# Patient Record
Sex: FEMALE | Race: WHITE | NOT HISPANIC OR LATINO
[De-identification: names, ages, dates, MRNs, and addresses within clinical notes are randomized per-mention and may not be internally consistent; named-entity substitution may affect disease eponyms.]

---

## 2017-06-27 ENCOUNTER — MEDICATION RENEWAL (OUTPATIENT)
Age: 62
End: 2017-06-27

## 2017-08-09 ENCOUNTER — APPOINTMENT (OUTPATIENT)
Dept: UROLOGY | Facility: CLINIC | Age: 62
End: 2017-08-09
Payer: MEDICARE

## 2017-08-09 VITALS — DIASTOLIC BLOOD PRESSURE: 90 MMHG | TEMPERATURE: 97 F | SYSTOLIC BLOOD PRESSURE: 130 MMHG | HEART RATE: 88 BPM

## 2017-08-09 DIAGNOSIS — N39.0 URINARY TRACT INFECTION, SITE NOT SPECIFIED: ICD-10-CM

## 2017-08-09 DIAGNOSIS — Z87.440 PERSONAL HISTORY OF URINARY (TRACT) INFECTIONS: ICD-10-CM

## 2017-08-09 PROCEDURE — 99214 OFFICE O/P EST MOD 30 MIN: CPT

## 2017-09-18 ENCOUNTER — APPOINTMENT (OUTPATIENT)
Dept: OBGYN | Facility: CLINIC | Age: 62
End: 2017-09-18
Payer: MEDICARE

## 2017-09-18 VITALS
SYSTOLIC BLOOD PRESSURE: 131 MMHG | BODY MASS INDEX: 49.08 KG/M2 | HEIGHT: 60 IN | WEIGHT: 250 LBS | DIASTOLIC BLOOD PRESSURE: 82 MMHG

## 2017-09-18 DIAGNOSIS — Z91.89 OTHER SPECIFIED PERSONAL RISK FACTORS, NOT ELSEWHERE CLASSIFIED: ICD-10-CM

## 2017-09-18 DIAGNOSIS — Z78.9 OTHER SPECIFIED HEALTH STATUS: ICD-10-CM

## 2017-09-18 DIAGNOSIS — F15.90 OTHER STIMULANT USE, UNSPECIFIED, UNCOMPLICATED: ICD-10-CM

## 2017-09-18 DIAGNOSIS — Z82.3 FAMILY HISTORY OF STROKE: ICD-10-CM

## 2017-09-18 DIAGNOSIS — Z82.49 FAMILY HISTORY OF ISCHEMIC HEART DISEASE AND OTHER DISEASES OF THE CIRCULATORY SYSTEM: ICD-10-CM

## 2017-09-18 LAB — HEMOCCULT SP1 STL QL: NEGATIVE

## 2017-09-18 PROCEDURE — 82270 OCCULT BLOOD FECES: CPT

## 2017-09-18 PROCEDURE — G0101: CPT

## 2017-09-25 LAB — CYTOLOGY CVX/VAG DOC THIN PREP: NORMAL

## 2017-09-27 ENCOUNTER — APPOINTMENT (OUTPATIENT)
Dept: OBGYN | Facility: CLINIC | Age: 62
End: 2017-09-27
Payer: MEDICARE

## 2017-09-27 DIAGNOSIS — G35 MULTIPLE SCLEROSIS: ICD-10-CM

## 2017-09-27 DIAGNOSIS — G82.50 QUADRIPLEGIA, UNSPECIFIED: ICD-10-CM

## 2017-09-27 PROCEDURE — 58340 CATHETER FOR HYSTEROGRAPHY: CPT

## 2017-09-27 PROCEDURE — 99214 OFFICE O/P EST MOD 30 MIN: CPT | Mod: 25

## 2017-09-27 PROCEDURE — 76831 ECHO EXAM UTERUS: CPT

## 2017-10-11 ENCOUNTER — APPOINTMENT (OUTPATIENT)
Dept: MAMMOGRAPHY | Facility: CLINIC | Age: 62
End: 2017-10-11
Payer: MEDICARE

## 2017-10-11 ENCOUNTER — OUTPATIENT (OUTPATIENT)
Dept: OUTPATIENT SERVICES | Facility: HOSPITAL | Age: 62
LOS: 1 days | End: 2017-10-11
Payer: MEDICARE

## 2017-10-11 ENCOUNTER — APPOINTMENT (OUTPATIENT)
Dept: ULTRASOUND IMAGING | Facility: CLINIC | Age: 62
End: 2017-10-11
Payer: MEDICARE

## 2017-10-11 DIAGNOSIS — Z00.8 ENCOUNTER FOR OTHER GENERAL EXAMINATION: ICD-10-CM

## 2017-10-11 DIAGNOSIS — Z12.11 ENCOUNTER FOR SCREENING FOR MALIGNANT NEOPLASM OF COLON: ICD-10-CM

## 2017-10-11 PROCEDURE — 76641 ULTRASOUND BREAST COMPLETE: CPT | Mod: 26,50

## 2017-10-11 PROCEDURE — 76641 ULTRASOUND BREAST COMPLETE: CPT

## 2017-11-24 ENCOUNTER — OUTPATIENT (OUTPATIENT)
Dept: OUTPATIENT SERVICES | Facility: HOSPITAL | Age: 62
LOS: 1 days | Discharge: HOME | End: 2017-11-24

## 2017-11-24 VITALS
SYSTOLIC BLOOD PRESSURE: 121 MMHG | HEART RATE: 85 BPM | RESPIRATION RATE: 15 BRPM | HEIGHT: 60 IN | TEMPERATURE: 98 F | OXYGEN SATURATION: 98 % | DIASTOLIC BLOOD PRESSURE: 74 MMHG

## 2017-11-24 DIAGNOSIS — G82.50 QUADRIPLEGIA, UNSPECIFIED: ICD-10-CM

## 2017-11-24 DIAGNOSIS — N84.0 POLYP OF CORPUS UTERI: ICD-10-CM

## 2017-11-24 DIAGNOSIS — Z98.890 OTHER SPECIFIED POSTPROCEDURAL STATES: Chronic | ICD-10-CM

## 2017-11-24 DIAGNOSIS — N95.0 POSTMENOPAUSAL BLEEDING: ICD-10-CM

## 2017-11-24 DIAGNOSIS — G35 MULTIPLE SCLEROSIS: ICD-10-CM

## 2017-11-24 DIAGNOSIS — F32.9 MAJOR DEPRESSIVE DISORDER, SINGLE EPISODE, UNSPECIFIED: ICD-10-CM

## 2017-11-24 DIAGNOSIS — F41.9 ANXIETY DISORDER, UNSPECIFIED: ICD-10-CM

## 2017-11-24 DIAGNOSIS — E03.9 HYPOTHYROIDISM, UNSPECIFIED: ICD-10-CM

## 2017-11-24 DIAGNOSIS — Z01.818 ENCOUNTER FOR OTHER PREPROCEDURAL EXAMINATION: ICD-10-CM

## 2017-11-24 LAB
ABO RH CONFIRMATION: SIGNIFICANT CHANGE UP
ANION GAP SERPL CALC-SCNC: 9 MMOL/L — SIGNIFICANT CHANGE UP (ref 5–17)
BASOPHILS # BLD AUTO: 0.1 K/UL — SIGNIFICANT CHANGE UP (ref 0–0.2)
BASOPHILS NFR BLD AUTO: 1.1 % — SIGNIFICANT CHANGE UP (ref 0–2)
BUN SERPL-MCNC: 12 MG/DL — SIGNIFICANT CHANGE UP (ref 7–23)
CALCIUM SERPL-MCNC: 9.1 MG/DL — SIGNIFICANT CHANGE UP (ref 8.5–10.1)
CHLORIDE SERPL-SCNC: 109 MMOL/L — HIGH (ref 96–108)
CO2 SERPL-SCNC: 26 MMOL/L — SIGNIFICANT CHANGE UP (ref 22–31)
CREAT SERPL-MCNC: 0.57 MG/DL — SIGNIFICANT CHANGE UP (ref 0.5–1.3)
EOSINOPHIL # BLD AUTO: 0.2 K/UL — SIGNIFICANT CHANGE UP (ref 0–0.5)
EOSINOPHIL NFR BLD AUTO: 2.2 % — SIGNIFICANT CHANGE UP (ref 0–6)
GLUCOSE SERPL-MCNC: 114 MG/DL — HIGH (ref 70–99)
HCT VFR BLD CALC: 43.2 % — SIGNIFICANT CHANGE UP (ref 34.5–45)
HGB BLD-MCNC: 14.1 G/DL — SIGNIFICANT CHANGE UP (ref 11.5–15.5)
LYMPHOCYTES # BLD AUTO: 1.8 K/UL — SIGNIFICANT CHANGE UP (ref 1–3.3)
LYMPHOCYTES # BLD AUTO: 21.8 % — SIGNIFICANT CHANGE UP (ref 13–44)
MCHC RBC-ENTMCNC: 30.4 PG — SIGNIFICANT CHANGE UP (ref 27–34)
MCHC RBC-ENTMCNC: 32.6 GM/DL — SIGNIFICANT CHANGE UP (ref 32–36)
MCV RBC AUTO: 93.2 FL — SIGNIFICANT CHANGE UP (ref 80–100)
MONOCYTES # BLD AUTO: 0.3 K/UL — SIGNIFICANT CHANGE UP (ref 0–0.9)
MONOCYTES NFR BLD AUTO: 3.5 % — SIGNIFICANT CHANGE UP (ref 2–14)
NEUTROPHILS # BLD AUTO: 5.9 K/UL — SIGNIFICANT CHANGE UP (ref 1.8–7.4)
NEUTROPHILS NFR BLD AUTO: 71.4 % — SIGNIFICANT CHANGE UP (ref 43–77)
PLATELET # BLD AUTO: 279 K/UL — SIGNIFICANT CHANGE UP (ref 150–400)
POTASSIUM SERPL-MCNC: 3.5 MMOL/L — SIGNIFICANT CHANGE UP (ref 3.5–5.3)
POTASSIUM SERPL-SCNC: 3.5 MMOL/L — SIGNIFICANT CHANGE UP (ref 3.5–5.3)
RBC # BLD: 4.64 M/UL — SIGNIFICANT CHANGE UP (ref 3.8–5.2)
RBC # FLD: 13.8 % — SIGNIFICANT CHANGE UP (ref 10.3–14.5)
SODIUM SERPL-SCNC: 144 MMOL/L — SIGNIFICANT CHANGE UP (ref 135–145)
TYPE + AB SCN PNL BLD: SIGNIFICANT CHANGE UP
WBC # BLD: 8.2 K/UL — SIGNIFICANT CHANGE UP (ref 3.8–10.5)
WBC # FLD AUTO: 8.2 K/UL — SIGNIFICANT CHANGE UP (ref 3.8–10.5)

## 2017-11-24 NOTE — H&P PST ADULT - HISTORY OF PRESENT ILLNESS
61 yo female presents to PST prior to proposed procedure. c/o occasional vaginal bleeding & hematuria in catheter bag. Consulted with Dr Avalos for sonogram & was diagnosed witha polyp. Denies abdominal pain or fevers.   Now for said procedure. 63 yo female presents to PST prior to proposed procedure. c/o occasional vaginal bleeding & hematuria in catheter bag. Consulted with Dr Avalos for sonogram & was diagnosed with a polyp. Denies abdominal pain or fevers.   Now for said procedure.

## 2017-11-24 NOTE — H&P PST ADULT - ASSESSMENT
63 yo scheduled for   with    on     1. Labs as per surgeon  2. EKG  3. Medical clearance with  4. discussed EZ sponges & day of procedure instructions 61 yo scheduled for   with    on     1. Labs as per surgeon  2. Medical clearance with neurology MONIK Pritchard  4. discussed NPO & day of procedure instructions

## 2017-11-24 NOTE — H&P PST ADULT - PSH
Fracture Bone  left foot 5th digit repaired surgically 2002  History of bladder surgery  insertion of suprapubic cather 2012?  Presence of intrathecal baclofen pump  2010  S/P colonoscopy    S/P Dilatation and Curettage  2001  S/P Tonsillectomy  1963

## 2017-11-24 NOTE — H&P PST ADULT - PMH
Cervical polyp    Depression  treated since 1999  Hematuria    Hypothyroid    Multiple Sclerosis  since 1997  Post-menopausal bleeding    Suprapubic catheter    Wheelchair dependent Cervical polyp    Chronic UTI    Depression  treated since 1999  Hematuria    Hypothyroid    Multiple Sclerosis  since 1997  Post-menopausal bleeding    Suprapubic catheter    Wheelchair dependent

## 2017-11-24 NOTE — H&P PST ADULT - FAMILY HISTORY
Father  Still living? No  Family history of CVA, Age at diagnosis: Age Unknown     Sibling  Still living? Yes, Estimated age: 61-70  Family history of leukemia, Age at diagnosis: Age Unknown

## 2017-11-25 LAB
HCV AB S/CO SERPL IA: 0.11 S/CO — SIGNIFICANT CHANGE UP
HCV AB SERPL-IMP: SIGNIFICANT CHANGE UP

## 2017-11-27 RX ORDER — ACETAMINOPHEN 500 MG
975 TABLET ORAL ONCE
Qty: 0 | Refills: 0 | Status: COMPLETED | OUTPATIENT
Start: 2017-11-28 | End: 2017-11-28

## 2017-11-27 RX ORDER — FAMOTIDINE 10 MG/ML
20 INJECTION INTRAVENOUS ONCE
Qty: 0 | Refills: 0 | Status: COMPLETED | OUTPATIENT
Start: 2017-11-28 | End: 2017-11-28

## 2017-11-28 ENCOUNTER — OUTPATIENT (OUTPATIENT)
Dept: OUTPATIENT SERVICES | Facility: HOSPITAL | Age: 62
LOS: 1 days | Discharge: HOME | End: 2017-11-28
Payer: MEDICARE

## 2017-11-28 ENCOUNTER — RESULT REVIEW (OUTPATIENT)
Age: 62
End: 2017-11-28

## 2017-11-28 VITALS
TEMPERATURE: 97 F | RESPIRATION RATE: 14 BRPM | HEIGHT: 60 IN | OXYGEN SATURATION: 99 % | DIASTOLIC BLOOD PRESSURE: 72 MMHG | HEART RATE: 78 BPM | SYSTOLIC BLOOD PRESSURE: 117 MMHG

## 2017-11-28 VITALS
RESPIRATION RATE: 14 BRPM | OXYGEN SATURATION: 99 % | SYSTOLIC BLOOD PRESSURE: 113 MMHG | TEMPERATURE: 98 F | DIASTOLIC BLOOD PRESSURE: 68 MMHG | HEART RATE: 73 BPM

## 2017-11-28 DIAGNOSIS — Z98.890 OTHER SPECIFIED POSTPROCEDURAL STATES: Chronic | ICD-10-CM

## 2017-11-28 PROCEDURE — 58558 HYSTEROSCOPY BIOPSY: CPT

## 2017-11-28 PROCEDURE — 88305 TISSUE EXAM BY PATHOLOGIST: CPT | Mod: 26

## 2017-11-28 RX ORDER — MEPERIDINE HYDROCHLORIDE 50 MG/ML
12.5 INJECTION INTRAMUSCULAR; INTRAVENOUS; SUBCUTANEOUS
Qty: 0 | Refills: 0 | Status: DISCONTINUED | OUTPATIENT
Start: 2017-11-28 | End: 2017-11-29

## 2017-11-28 RX ORDER — KETOROLAC TROMETHAMINE 30 MG/ML
30 SYRINGE (ML) INJECTION ONCE
Qty: 0 | Refills: 0 | Status: DISCONTINUED | OUTPATIENT
Start: 2017-11-28 | End: 2017-11-28

## 2017-11-28 RX ORDER — SODIUM CHLORIDE 9 MG/ML
3 INJECTION INTRAMUSCULAR; INTRAVENOUS; SUBCUTANEOUS EVERY 8 HOURS
Qty: 0 | Refills: 0 | Status: DISCONTINUED | OUTPATIENT
Start: 2017-11-28 | End: 2017-11-28

## 2017-11-28 RX ORDER — SODIUM CHLORIDE 9 MG/ML
1000 INJECTION INTRAMUSCULAR; INTRAVENOUS; SUBCUTANEOUS
Qty: 0 | Refills: 0 | Status: DISCONTINUED | OUTPATIENT
Start: 2017-11-28 | End: 2017-11-29

## 2017-11-28 RX ORDER — FENTANYL CITRATE 50 UG/ML
50 INJECTION INTRAVENOUS
Qty: 0 | Refills: 0 | Status: DISCONTINUED | OUTPATIENT
Start: 2017-11-28 | End: 2017-11-29

## 2017-11-28 RX ORDER — OXYCODONE HYDROCHLORIDE 5 MG/1
5 TABLET ORAL EVERY 4 HOURS
Qty: 0 | Refills: 0 | Status: DISCONTINUED | OUTPATIENT
Start: 2017-11-28 | End: 2017-11-29

## 2017-11-28 RX ORDER — ONDANSETRON 8 MG/1
4 TABLET, FILM COATED ORAL ONCE
Qty: 0 | Refills: 0 | Status: DISCONTINUED | OUTPATIENT
Start: 2017-11-28 | End: 2017-11-29

## 2017-11-28 RX ADMIN — FAMOTIDINE 20 MILLIGRAM(S): 10 INJECTION INTRAVENOUS at 11:43

## 2017-11-28 RX ADMIN — Medication 30 MILLIGRAM(S): at 14:20

## 2017-11-28 RX ADMIN — Medication 975 MILLIGRAM(S): at 11:43

## 2017-11-28 NOTE — BRIEF OPERATIVE NOTE - PROCEDURE
<<-----Click on this checkbox to enter Procedure Dilation and curettage of uterus  11/28/2017    Active  RBLISS  Hysteroscopic resection of endometrial polyp  11/28/2017    Active  RBLISS

## 2017-11-28 NOTE — BRIEF OPERATIVE NOTE - PRE-OP DX
Endometrial polyp  11/28/2017    Active  Jadon Avalos  Postmenopausal bleeding  11/28/2017    Active  Jadon Avalos

## 2017-11-28 NOTE — ASU DISCHARGE PLAN (ADULT/PEDIATRIC). - MEDICATION SUMMARY - MEDICATIONS TO TAKE
I will START or STAY ON the medications listed below when I get home from the hospital:    baclofen  -- Indication: For POLYP OF CORPUS UTERI,POSTMENOPAUSAL BLEEDING    amoxicillin 250 mg oral tablet, chewable  -- 1 tab(s) by mouth every other day (at bedtime)  -- Indication: For POLYP OF CORPUS UTERI,POSTMENOPAUSAL BLEEDING    buPROPion 200 mg/12 hours (SR) oral tablet, extended release  -- 1 tab(s) by mouth 2 times a day  -- Indication: For POLYP OF CORPUS UTERI,POSTMENOPAUSAL BLEEDING    levothyroxine 50 mcg (0.05 mg) oral tablet  -- 1 tab(s) by mouth once a day  -- Indication: For POLYP OF CORPUS UTERI,POSTMENOPAUSAL BLEEDING    Vitamin D3 1000 intl units oral tablet  -- 1 tab(s) by mouth once a day  -- Indication: For POLYP OF CORPUS UTERI,POSTMENOPAUSAL BLEEDING

## 2017-11-28 NOTE — BRIEF OPERATIVE NOTE - OPERATION/FINDINGS
large, axial uterus with irregular polyp filling cavity and arising from posterior wall; resected using Myosure resecting hysteroscope under visualization.  dilation and curettage of uterus with small endometrial curettings.  NS used 2100 cc/1700 cc returned; 400 cc deficit.

## 2017-11-28 NOTE — ASU PATIENT PROFILE, ADULT - PMH
Cervical polyp    Chronic UTI    Depression  treated since 1999  Hematuria    Hypothyroid    Multiple Sclerosis  since 1997  Post-menopausal bleeding    Suprapubic catheter    Wheelchair dependent

## 2017-11-28 NOTE — ASU DISCHARGE PLAN (ADULT/PEDIATRIC). - NOTIFY
Increased Irritability or Sluggishness/Inability to Tolerate Liquids or Foods/Pain not relieved by Medications/Fever greater than 101/Persistent Nausea and Vomiting/Excessive Diarrhea

## 2017-12-01 LAB — SURGICAL PATHOLOGY FINAL REPORT - CH: SIGNIFICANT CHANGE UP

## 2017-12-07 ENCOUNTER — OTHER (OUTPATIENT)
Age: 62
End: 2017-12-07

## 2017-12-08 DIAGNOSIS — G35 MULTIPLE SCLEROSIS: ICD-10-CM

## 2017-12-08 DIAGNOSIS — F32.9 MAJOR DEPRESSIVE DISORDER, SINGLE EPISODE, UNSPECIFIED: ICD-10-CM

## 2017-12-08 DIAGNOSIS — N84.0 POLYP OF CORPUS UTERI: ICD-10-CM

## 2017-12-08 DIAGNOSIS — N95.0 POSTMENOPAUSAL BLEEDING: ICD-10-CM

## 2017-12-08 DIAGNOSIS — F41.9 ANXIETY DISORDER, UNSPECIFIED: ICD-10-CM

## 2017-12-08 DIAGNOSIS — E03.9 HYPOTHYROIDISM, UNSPECIFIED: ICD-10-CM

## 2017-12-08 DIAGNOSIS — G82.50 QUADRIPLEGIA, UNSPECIFIED: ICD-10-CM

## 2017-12-12 ENCOUNTER — APPOINTMENT (OUTPATIENT)
Dept: OBGYN | Facility: CLINIC | Age: 62
End: 2017-12-12
Payer: MEDICARE

## 2017-12-12 VITALS
HEIGHT: 60 IN | BODY MASS INDEX: 49.08 KG/M2 | SYSTOLIC BLOOD PRESSURE: 130 MMHG | WEIGHT: 250 LBS | DIASTOLIC BLOOD PRESSURE: 80 MMHG

## 2017-12-12 PROCEDURE — 99024 POSTOP FOLLOW-UP VISIT: CPT

## 2018-02-06 ENCOUNTER — APPOINTMENT (OUTPATIENT)
Dept: UROLOGY | Facility: CLINIC | Age: 63
End: 2018-02-06

## 2018-11-20 PROBLEM — Z93.59 OTHER CYSTOSTOMY STATUS: Chronic | Status: ACTIVE | Noted: 2017-11-24

## 2018-11-20 PROBLEM — E03.9 HYPOTHYROIDISM, UNSPECIFIED: Chronic | Status: ACTIVE | Noted: 2017-11-24

## 2018-11-20 PROBLEM — N84.1 POLYP OF CERVIX UTERI: Chronic | Status: ACTIVE | Noted: 2017-11-24

## 2018-11-20 PROBLEM — Z99.3 DEPENDENCE ON WHEELCHAIR: Chronic | Status: ACTIVE | Noted: 2017-11-24

## 2018-11-20 PROBLEM — N39.0 URINARY TRACT INFECTION, SITE NOT SPECIFIED: Chronic | Status: ACTIVE | Noted: 2017-11-24

## 2018-11-20 PROBLEM — N95.0 POSTMENOPAUSAL BLEEDING: Chronic | Status: ACTIVE | Noted: 2017-11-24

## 2019-01-29 ENCOUNTER — APPOINTMENT (OUTPATIENT)
Dept: OBGYN | Facility: CLINIC | Age: 64
End: 2019-01-29
Payer: MEDICARE

## 2019-01-29 VITALS
BODY MASS INDEX: 49.08 KG/M2 | WEIGHT: 250 LBS | HEIGHT: 60 IN | DIASTOLIC BLOOD PRESSURE: 62 MMHG | SYSTOLIC BLOOD PRESSURE: 112 MMHG

## 2019-01-29 DIAGNOSIS — Z01.419 ENCOUNTER FOR GYNECOLOGICAL EXAMINATION (GENERAL) (ROUTINE) W/OUT ABNORMAL FINDINGS: ICD-10-CM

## 2019-01-29 DIAGNOSIS — Z12.11 ENCOUNTER FOR SCREENING FOR MALIGNANT NEOPLASM OF COLON: ICD-10-CM

## 2019-01-29 LAB — HEMOCCULT SP1 STL QL: NEGATIVE

## 2019-01-29 PROCEDURE — 82270 OCCULT BLOOD FECES: CPT

## 2019-01-29 PROCEDURE — 99214 OFFICE O/P EST MOD 30 MIN: CPT

## 2019-01-29 RX ORDER — OCRELIZUMAB 300 MG/10ML
INJECTION INTRAVENOUS
Refills: 0 | Status: ACTIVE | COMMUNITY

## 2019-01-29 NOTE — BEGINNING OF VISIT
[Patient] : patient [Spouse] : spouse [Family Member] : family member [FreeTextEntry1] :  AND DAUGHTER PRESENT

## 2019-01-29 NOTE — PHYSICAL EXAM
[Awake] : awake [Alert] : alert [Soft] : soft [Oriented x3] : oriented to person, place, and time [Normal] : cervix [No Bleeding] : there was no active vaginal bleeding [Nl Sphincter Tone] : normal sphincter tone [External Hemorrhoid] : an external hemorrhoid [Acute Distress] : no acute distress [Mass] : no breast mass [Nipple Discharge] : no nipple discharge [Axillary LAD] : no axillary lymphadenopathy [Tender] : non tender [Discharge] : no discharge [Occult Blood] : occult blood test from digital rectal exam was negative [FreeTextEntry7] : UNABLE TO PALPATE DUE TO MORBID OBESITY

## 2019-01-29 NOTE — REVIEW OF SYSTEMS
[Sleep Disturbances] : sleep disturbances [Anxiety] : anxiety [Depression] : depression [Nl] : Integumentary

## 2019-01-29 NOTE — HISTORY OF PRESENT ILLNESS
[1 Year Ago] : 1 year ago [Fair] : being in fair health [Weight Concerns] : weight concens [Last Mammogram ___] : Last Mammogram was [unfilled] [Last Colonoscopy ___] : Last colonoscopy [unfilled] [Last Pap ___] : Last cervical pap smear was [unfilled] [Postmenopausal] : is postmenopausal [Definite:  ___ (Date)] : the last menstrual period was [unfilled] [Currently In Menopause] : currently in menopause [Regular Exercise] : not exercising regularly [Hot Flashes] : no hot flashes [Night Sweats] : no night sweats [Experiencing Menopausal Sxs] : not experiencing menopausal symptoms

## 2019-06-17 ENCOUNTER — APPOINTMENT (OUTPATIENT)
Dept: OBGYN | Facility: CLINIC | Age: 64
End: 2019-06-17
Payer: MEDICARE

## 2019-06-17 PROCEDURE — 99213 OFFICE O/P EST LOW 20 MIN: CPT

## 2019-06-17 NOTE — CHIEF COMPLAINT
[Urgent Visit] : Urgent Visit [FreeTextEntry1] : HERE WITH DAUGHTER AND , STARTED HAVING BROWN STAINING LAST WEEK, TURNED INTO BRIGHT RED BLOOD THIS WEEK.  NO PAIN, DIME-SIZED CLOTS IN URINE ON DAY OF CATHETER CHANGE.  NO GROSS BLOOD SEEN IN URINE IN CATHETER BAG.  \par \par HAD D&C POLYPECTOMY IN 12/2017.  \par \par DISCUSSED EVALUATION OF PMB.  DUE TO PATIENT'S LIMITED MOBILITY, WOULD SCHEDULE SIS WITH ENDOMETRIAL BIOPSY AND PAP AT SAME TIME.  CONSIDER EXPECTANT MANAGEMENT OF BENIGN PATHOLOGY PENDING RESULTS.\par \par DAUGHTER, ADI, IS ENGAGED. 1/2/2021.

## 2019-06-24 ENCOUNTER — ASOB RESULT (OUTPATIENT)
Age: 64
End: 2019-06-24

## 2019-06-24 ENCOUNTER — APPOINTMENT (OUTPATIENT)
Dept: OBGYN | Facility: CLINIC | Age: 64
End: 2019-06-24
Payer: MEDICARE

## 2019-06-24 DIAGNOSIS — R93.89 ABNORMAL FINDINGS ON DIAGNOSTIC IMAGING OF OTHER SPECIFIED BODY STRUCTURES: ICD-10-CM

## 2019-06-24 DIAGNOSIS — N95.0 POSTMENOPAUSAL BLEEDING: ICD-10-CM

## 2019-06-24 DIAGNOSIS — N84.0 POLYP OF CORPUS UTERI: ICD-10-CM

## 2019-06-24 PROCEDURE — 76831 ECHO EXAM UTERUS: CPT

## 2019-06-24 PROCEDURE — 58340 CATHETER FOR HYSTEROGRAPHY: CPT

## 2019-06-24 PROCEDURE — 58100 BIOPSY OF UTERUS LINING: CPT

## 2019-06-24 NOTE — PROCEDURE
[Cervical Pap Smear] : cervical Pap smear [Liquid Base] : liquid base [Tolerated Well] : the patient tolerated the procedure well [Endometrial Biopsy] : Endometrial biopsy [Post-Menop. Bleeding] : post-menopausal bleeding [Abnormal US] : abnormal ultrasound findings [Risks] : risks [Benefits] : benefits [Alternatives] : alternatives [Patient] : patient [Infection] : infection [Bleeding] : bleeding [Allergic Reaction] : allergic reaction [Pain] : pain [CONSENT OBTAINED] : written consent was obtained prior to the procedure. [LMP ___] : LMP was [unfilled] [No Premedication] : No premedication [Betadine] : Betadine [None] : none [Tenaculum] : a single toothed tenaculum [Pipelle] : a Pipelle endometrial suction curette [Required Dilation] : required dilation [Scant] : a scant [Tolerated Poorly] : the patient tolerated the procedure poorly [Sent to Histology] : the specimen was place in buffered formalin and sent for pathlogy [No Complications] : there were no complications [de-identified] : OS-FINDER MINI-DILATOR WITH DIFFICULTY

## 2019-07-01 LAB
CORE LAB BIOPSY: NORMAL
CYTOLOGY CVX/VAG DOC THIN PREP: NORMAL

## 2019-07-29 ENCOUNTER — MOBILE ON CALL (OUTPATIENT)
Age: 64
End: 2019-07-29

## 2019-08-23 ENCOUNTER — APPOINTMENT (OUTPATIENT)
Dept: GYNECOLOGIC ONCOLOGY | Facility: CLINIC | Age: 64
End: 2019-08-23

## 2019-09-17 ENCOUNTER — OUTPATIENT (OUTPATIENT)
Dept: OUTPATIENT SERVICES | Facility: HOSPITAL | Age: 64
LOS: 1 days | End: 2019-09-17
Payer: MEDICARE

## 2019-09-17 VITALS
HEIGHT: 60 IN | TEMPERATURE: 98 F | SYSTOLIC BLOOD PRESSURE: 128 MMHG | DIASTOLIC BLOOD PRESSURE: 82 MMHG | HEART RATE: 92 BPM | OXYGEN SATURATION: 97 % | RESPIRATION RATE: 20 BRPM

## 2019-09-17 DIAGNOSIS — Z98.890 OTHER SPECIFIED POSTPROCEDURAL STATES: Chronic | ICD-10-CM

## 2019-09-17 DIAGNOSIS — N95.0 POSTMENOPAUSAL BLEEDING: ICD-10-CM

## 2019-09-17 DIAGNOSIS — Z01.818 ENCOUNTER FOR OTHER PREPROCEDURAL EXAMINATION: ICD-10-CM

## 2019-09-17 LAB
ANION GAP SERPL CALC-SCNC: 7 MMOL/L — SIGNIFICANT CHANGE UP (ref 5–17)
BASOPHILS # BLD AUTO: 0.11 K/UL — SIGNIFICANT CHANGE UP (ref 0–0.2)
BASOPHILS NFR BLD AUTO: 1 % — SIGNIFICANT CHANGE UP (ref 0–2)
BUN SERPL-MCNC: 18 MG/DL — SIGNIFICANT CHANGE UP (ref 7–23)
CALCIUM SERPL-MCNC: 9 MG/DL — SIGNIFICANT CHANGE UP (ref 8.5–10.1)
CHLORIDE SERPL-SCNC: 108 MMOL/L — SIGNIFICANT CHANGE UP (ref 96–108)
CO2 SERPL-SCNC: 26 MMOL/L — SIGNIFICANT CHANGE UP (ref 22–31)
CREAT SERPL-MCNC: 0.47 MG/DL — LOW (ref 0.5–1.3)
EOSINOPHIL # BLD AUTO: 0.3 K/UL — SIGNIFICANT CHANGE UP (ref 0–0.5)
EOSINOPHIL NFR BLD AUTO: 2.6 % — SIGNIFICANT CHANGE UP (ref 0–6)
GLUCOSE SERPL-MCNC: 94 MG/DL — SIGNIFICANT CHANGE UP (ref 70–99)
HCT VFR BLD CALC: 42.8 % — SIGNIFICANT CHANGE UP (ref 34.5–45)
HGB BLD-MCNC: 14.3 G/DL — SIGNIFICANT CHANGE UP (ref 11.5–15.5)
IMM GRANULOCYTES NFR BLD AUTO: 0.4 % — SIGNIFICANT CHANGE UP (ref 0–1.5)
LYMPHOCYTES # BLD AUTO: 2.49 K/UL — SIGNIFICANT CHANGE UP (ref 1–3.3)
LYMPHOCYTES # BLD AUTO: 22 % — SIGNIFICANT CHANGE UP (ref 13–44)
MCHC RBC-ENTMCNC: 29.8 PG — SIGNIFICANT CHANGE UP (ref 27–34)
MCHC RBC-ENTMCNC: 33.4 GM/DL — SIGNIFICANT CHANGE UP (ref 32–36)
MCV RBC AUTO: 89.2 FL — SIGNIFICANT CHANGE UP (ref 80–100)
MONOCYTES # BLD AUTO: 0.97 K/UL — HIGH (ref 0–0.9)
MONOCYTES NFR BLD AUTO: 8.6 % — SIGNIFICANT CHANGE UP (ref 2–14)
NEUTROPHILS # BLD AUTO: 7.43 K/UL — HIGH (ref 1.8–7.4)
NEUTROPHILS NFR BLD AUTO: 65.4 % — SIGNIFICANT CHANGE UP (ref 43–77)
PLATELET # BLD AUTO: 348 K/UL — SIGNIFICANT CHANGE UP (ref 150–400)
POTASSIUM SERPL-MCNC: 3.9 MMOL/L — SIGNIFICANT CHANGE UP (ref 3.5–5.3)
POTASSIUM SERPL-SCNC: 3.9 MMOL/L — SIGNIFICANT CHANGE UP (ref 3.5–5.3)
RBC # BLD: 4.8 M/UL — SIGNIFICANT CHANGE UP (ref 3.8–5.2)
RBC # FLD: 15.4 % — HIGH (ref 10.3–14.5)
SODIUM SERPL-SCNC: 141 MMOL/L — SIGNIFICANT CHANGE UP (ref 135–145)
WBC # BLD: 11.34 K/UL — HIGH (ref 3.8–10.5)
WBC # FLD AUTO: 11.34 K/UL — HIGH (ref 3.8–10.5)

## 2019-09-17 PROCEDURE — 86901 BLOOD TYPING SEROLOGIC RH(D): CPT

## 2019-09-17 PROCEDURE — 85025 COMPLETE CBC W/AUTO DIFF WBC: CPT

## 2019-09-17 PROCEDURE — 86900 BLOOD TYPING SEROLOGIC ABO: CPT

## 2019-09-17 PROCEDURE — 93005 ELECTROCARDIOGRAM TRACING: CPT

## 2019-09-17 PROCEDURE — 36415 COLL VENOUS BLD VENIPUNCTURE: CPT

## 2019-09-17 PROCEDURE — 93010 ELECTROCARDIOGRAM REPORT: CPT

## 2019-09-17 PROCEDURE — 86850 RBC ANTIBODY SCREEN: CPT

## 2019-09-17 PROCEDURE — 80048 BASIC METABOLIC PNL TOTAL CA: CPT

## 2019-09-17 PROCEDURE — 86803 HEPATITIS C AB TEST: CPT

## 2019-09-17 PROCEDURE — G0463: CPT | Mod: 25

## 2019-09-17 RX ORDER — AMOXICILLIN 250 MG/5ML
1 SUSPENSION, RECONSTITUTED, ORAL (ML) ORAL
Qty: 0 | Refills: 0 | DISCHARGE

## 2019-09-17 NOTE — H&P PST ADULT - HISTORY OF PRESENT ILLNESS
63 y/o female with MS, wheelchair bound quadriplegic. Complain of post menopausal bleeding. Scheduled for D and C, hysteroscopy with myosure, polypectomy. 63 y/o female with MS, wheelchair bound quadriplegic. Pt with post menopausal bleeding. Scheduled for D and C, hysteroscopy with myosure, polypectomy.

## 2019-09-17 NOTE — H&P PST ADULT - ASSESSMENT
Plan  1. Stop all NSAIDS, herbal supplements and vitamins for 7 days.  2. NPO at midnight.  3. Take the following medications (Levothyroxine, Bupropion ) with small sips of water on the morning of your procedure/surgery.  4. Labs, EKG as per surgeon  5. PMD visit for optimization prior to surgery as per surgeon 63 y/o female with MS, wheelchair bound quadriplegic. Complain of post menopausal bleeding. Scheduled for D and C, hysteroscopy with myosure, polypectomy.  Plan  1. Stop all NSAIDS, herbal supplements and vitamins for 7 days.  2. NPO at midnight.  3. Take the following medications (Levothyroxine, Bupropion ) with small sips of water on the morning of your procedure/surgery.  4. Labs, EKG as per surgeon  5. PMD visit for optimization prior to surgery as per surgeon  6. Weigh on day of surgery. Daija lift needed on day of surgery ( OR booking notified, spoke with Holly) 65 y/o female with MS, wheelchair bound quadriplegic. Pt with post menopausal bleeding. Scheduled for D and C, hysteroscopy with myosure, polypectomy.  Plan  1. Stop all NSAIDS, herbal supplements and vitamins for 7 days.  2. NPO at midnight.  3. Take the following medications (Levothyroxine, Bupropion ) with small sips of water on the morning of your procedure/surgery.  4. Labs, EKG as per surgeon  5. PMD visit for optimization prior to surgery as per surgeon  6. Weigh on day of surgery. Daija lift needed on day of surgery ( OR booking notified, spoke with Holly)

## 2019-09-17 NOTE — H&P PST ADULT - NSICDXPASTSURGICALHX_GEN_ALL_CORE_FT
PAST SURGICAL HISTORY:  Fracture Bone left foot 5th digit repaired surgically 2002    History of bladder surgery insertion of suprapubic cather 2012?    Presence of intrathecal baclofen pump 2010    S/P colonoscopy     S/P Dilatation and Curettage 2001, 2016    S/P Tonsillectomy 1963

## 2019-09-17 NOTE — H&P PST ADULT - NSANTHOSAYNRD_GEN_A_CORE
No. PEPITO screening performed.  STOP BANG Legend: 0-2 = LOW Risk; 3-4 = INTERMEDIATE Risk; 5-8 = HIGH Risk

## 2019-09-17 NOTE — H&P PST ADULT - NSICDXFAMILYHX_GEN_ALL_CORE_FT
FAMILY HISTORY:  Father  Still living? No  Family history of CVA, Age at diagnosis: Age Unknown    Sibling  Still living? Yes, Estimated age: 61-70  Family history of leukemia, Age at diagnosis: Age Unknown

## 2019-09-17 NOTE — H&P PST ADULT - NSICDXPASTMEDICALHX_GEN_ALL_CORE_FT
PAST MEDICAL HISTORY:  Cervical polyp     Chronic UTI     Constipation     Depression treated since 1999    Hematuria     Hypothyroid     Morbid obesity     Multiple Sclerosis since 1997    Post-menopausal bleeding     Suprapubic catheter     Suprapubic catheter     Wheelchair dependent PAST MEDICAL HISTORY:  Cervical polyp     Chronic UTI     Constipation     Depression treated since 1999    Hypothyroid     Morbid obesity     Multiple Sclerosis since 1997    Post-menopausal bleeding     Suprapubic catheter     Wheelchair dependent

## 2019-09-18 DIAGNOSIS — N95.0 POSTMENOPAUSAL BLEEDING: ICD-10-CM

## 2019-09-18 DIAGNOSIS — Z01.818 ENCOUNTER FOR OTHER PREPROCEDURAL EXAMINATION: ICD-10-CM

## 2019-09-18 LAB
HCV AB S/CO SERPL IA: 0.1 S/CO — SIGNIFICANT CHANGE UP (ref 0–0.99)
HCV AB SERPL-IMP: SIGNIFICANT CHANGE UP

## 2019-09-24 RX ORDER — ONDANSETRON 8 MG/1
4 TABLET, FILM COATED ORAL ONCE
Refills: 0 | Status: DISCONTINUED | OUTPATIENT
Start: 2019-09-25 | End: 2019-09-25

## 2019-09-24 RX ORDER — OXYCODONE HYDROCHLORIDE 5 MG/1
10 TABLET ORAL ONCE
Refills: 0 | Status: DISCONTINUED | OUTPATIENT
Start: 2019-09-25 | End: 2019-09-25

## 2019-09-24 RX ORDER — SODIUM CHLORIDE 9 MG/ML
1000 INJECTION, SOLUTION INTRAVENOUS
Refills: 0 | Status: DISCONTINUED | OUTPATIENT
Start: 2019-09-25 | End: 2019-09-25

## 2019-09-24 RX ORDER — FENTANYL CITRATE 50 UG/ML
50 INJECTION INTRAVENOUS
Refills: 0 | Status: DISCONTINUED | OUTPATIENT
Start: 2019-09-25 | End: 2019-09-25

## 2019-09-25 ENCOUNTER — RESULT REVIEW (OUTPATIENT)
Age: 64
End: 2019-09-25

## 2019-09-25 ENCOUNTER — OUTPATIENT (OUTPATIENT)
Dept: INPATIENT UNIT | Facility: HOSPITAL | Age: 64
LOS: 1 days | Discharge: HOME | End: 2019-09-25
Payer: MEDICARE

## 2019-09-25 VITALS
DIASTOLIC BLOOD PRESSURE: 81 MMHG | WEIGHT: 250.45 LBS | OXYGEN SATURATION: 100 % | TEMPERATURE: 98 F | HEART RATE: 73 BPM | SYSTOLIC BLOOD PRESSURE: 133 MMHG | RESPIRATION RATE: 16 BRPM

## 2019-09-25 VITALS
RESPIRATION RATE: 16 BRPM | DIASTOLIC BLOOD PRESSURE: 72 MMHG | HEART RATE: 73 BPM | OXYGEN SATURATION: 99 % | SYSTOLIC BLOOD PRESSURE: 109 MMHG | TEMPERATURE: 97 F

## 2019-09-25 DIAGNOSIS — N95.0 POSTMENOPAUSAL BLEEDING: ICD-10-CM

## 2019-09-25 DIAGNOSIS — Z98.890 OTHER SPECIFIED POSTPROCEDURAL STATES: Chronic | ICD-10-CM

## 2019-09-25 DIAGNOSIS — R93.89 ABNORMAL FINDINGS ON DIAGNOSTIC IMAGING OF OTHER SPECIFIED BODY STRUCTURES: ICD-10-CM

## 2019-09-25 PROCEDURE — 88305 TISSUE EXAM BY PATHOLOGIST: CPT | Mod: 26

## 2019-09-25 PROCEDURE — 88305 TISSUE EXAM BY PATHOLOGIST: CPT

## 2019-09-25 PROCEDURE — 58558 HYSTEROSCOPY BIOPSY: CPT

## 2019-09-25 NOTE — ASU DISCHARGE PLAN (ADULT/PEDIATRIC) - CALL YOUR DOCTOR IF YOU HAVE ANY OF THE FOLLOWING:
Fever greater than (need to indicate Fahrenheit or Celsius)/Nausea and vomiting that does not stop/Inability to tolerate liquids or foods/Increased irritability or sluggishness

## 2019-09-25 NOTE — ASU PATIENT PROFILE, ADULT - PMH
Cervical polyp    Chronic UTI    Constipation    Depression  treated since 1999  Hypothyroid    Morbid obesity    Multiple Sclerosis  since 1997  Post-menopausal bleeding    Suprapubic catheter    Wheelchair dependent

## 2019-09-25 NOTE — ASU PATIENT PROFILE, ADULT - PSH
Fracture Bone  left foot 5th digit repaired surgically 2002  History of bladder surgery  insertion of suprapubic cather 2012?  Presence of intrathecal baclofen pump  2010  S/P colonoscopy    S/P Dilatation and Curettage  2001, 2016  S/P Tonsillectomy  1963

## 2019-09-25 NOTE — BRIEF OPERATIVE NOTE - NSICDXBRIEFPREOP_GEN_ALL_CORE_FT
PRE-OP DIAGNOSIS:  Increased endometrial stripe thickness 25-Sep-2019 12:25:30  Jadon Avalos  Postmenopausal bleeding 25-Sep-2019 12:25:23  Jadon Avalos

## 2019-09-25 NOTE — BRIEF OPERATIVE NOTE - OPERATION/FINDINGS
Anteverted, enlarged uterus with one large, irregular endometrial polyp and several smaller polyps, resected under visualization using Myosure hysteroscopic system.  Small endometrial curettings obtained.  Uterine fundus and tubal ostia visualized.  NS used 2600 cc with no fluid deficit.

## 2019-09-25 NOTE — BRIEF OPERATIVE NOTE - NSICDXBRIEFPROCEDURE_GEN_ALL_CORE_FT
PROCEDURES:  Polypectomy, hysteroscopic, uterus, with dilation and curettage 25-Sep-2019 12:25:07  Jadon Avalos

## 2019-09-25 NOTE — ASU DISCHARGE PLAN (ADULT/PEDIATRIC) - CARE PROVIDER_API CALL
Jadon Avalos)  Obstetrics and Gynecology  [address] [id number]  Phone: [phone]  Fax: [phone]  Follow Up Time:

## 2019-10-01 DIAGNOSIS — K21.9 GASTRO-ESOPHAGEAL REFLUX DISEASE WITHOUT ESOPHAGITIS: ICD-10-CM

## 2019-10-01 DIAGNOSIS — Z79.82 LONG TERM (CURRENT) USE OF ASPIRIN: ICD-10-CM

## 2019-10-01 DIAGNOSIS — G35 MULTIPLE SCLEROSIS: ICD-10-CM

## 2019-10-01 DIAGNOSIS — N95.0 POSTMENOPAUSAL BLEEDING: ICD-10-CM

## 2019-10-01 DIAGNOSIS — F32.9 MAJOR DEPRESSIVE DISORDER, SINGLE EPISODE, UNSPECIFIED: ICD-10-CM

## 2019-10-01 DIAGNOSIS — Z91.040 LATEX ALLERGY STATUS: ICD-10-CM

## 2019-10-01 DIAGNOSIS — N84.0 POLYP OF CORPUS UTERI: ICD-10-CM

## 2019-10-01 DIAGNOSIS — N85.4 MALPOSITION OF UTERUS: ICD-10-CM

## 2019-10-01 DIAGNOSIS — Z79.891 LONG TERM (CURRENT) USE OF OPIATE ANALGESIC: ICD-10-CM

## 2019-10-01 DIAGNOSIS — G82.50 QUADRIPLEGIA, UNSPECIFIED: ICD-10-CM

## 2019-10-01 DIAGNOSIS — I73.9 PERIPHERAL VASCULAR DISEASE, UNSPECIFIED: ICD-10-CM

## 2019-10-01 DIAGNOSIS — E66.01 MORBID (SEVERE) OBESITY DUE TO EXCESS CALORIES: ICD-10-CM

## 2019-10-01 DIAGNOSIS — E03.9 HYPOTHYROIDISM, UNSPECIFIED: ICD-10-CM

## 2019-10-07 ENCOUNTER — RESULT REVIEW (OUTPATIENT)
Age: 64
End: 2019-10-07

## 2019-10-08 PROBLEM — K59.00 CONSTIPATION, UNSPECIFIED: Chronic | Status: ACTIVE | Noted: 2019-09-17

## 2019-10-08 PROBLEM — E66.01 MORBID (SEVERE) OBESITY DUE TO EXCESS CALORIES: Chronic | Status: ACTIVE | Noted: 2019-09-17

## 2019-10-16 ENCOUNTER — APPOINTMENT (OUTPATIENT)
Dept: OBGYN | Facility: CLINIC | Age: 64
End: 2019-10-16

## 2019-10-18 ENCOUNTER — APPOINTMENT (OUTPATIENT)
Dept: GYNECOLOGIC ONCOLOGY | Facility: CLINIC | Age: 64
End: 2019-10-18
Payer: MEDICARE

## 2019-10-18 DIAGNOSIS — Z80.0 FAMILY HISTORY OF MALIGNANT NEOPLASM OF DIGESTIVE ORGANS: ICD-10-CM

## 2019-10-18 PROCEDURE — 99203 OFFICE O/P NEW LOW 30 MIN: CPT | Mod: 25

## 2019-10-18 PROCEDURE — 76857 US EXAM PELVIC LIMITED: CPT | Mod: 59

## 2019-10-18 RX ORDER — TRAMADOL HYDROCHLORIDE 25 MG/1
TABLET, COATED ORAL
Refills: 0 | Status: ACTIVE | COMMUNITY

## 2019-10-18 RX ORDER — NYSTATIN 100000 1/G
100000 POWDER TOPICAL TWICE DAILY
Qty: 1 | Refills: 3 | Status: DISCONTINUED | COMMUNITY
Start: 2017-06-27 | End: 2019-10-18

## 2019-10-18 NOTE — CHIEF COMPLAINT
[FreeTextEntry1] : [facility]\par [place] [facility]\par [address]\[place] [address]\par

## 2019-10-18 NOTE — ASSESSMENT
[FreeTextEntry1] : I discussed at length with the patient the nature, purpose, risks, benefits, and alternatives of Robot assisted total laparoscopic hysterectomy with bilateral salpingo-oophorectomy,sentinel lymph node mapping and possible retroperitoneal lymphadenectomy and staging (including possible omentectomy).  The patient understands the risks to include,but not be limited to, bowel injury, bleeding (with the possible need for transfusion), bladder or ureteral injury, infections, deep venous thrombosis, and yasemin-operative death.  The patient understands the utility of intraoperative frozen section to determine whether surgical staging will be performed. The patient also understands that her surgery may not be able to be performed robotically and that she may need a laparotomy.  She also understands the limitations of robotic surgery and the possibility of missing a surgical complication with need for subsequent re-exploration.  She agrees to proceed.  She asked numerous questions which were answered to her satisfaction.  She understands the need for a pre-operative bowel preparation and agrees to comply with our instructions.  She also understands the rationale for a cystoscopy at the completion of the procedure and the potential risks of cystoscopy.  The patient also understands the possible limitations of frozen section and the limitations of robotic staging compared to a laparotomy approach.\par

## 2019-10-18 NOTE — PHYSICAL EXAM
[Normal] : Mood and affect: Normal [de-identified] : Patient was seen and examined with chaperone Dayan Valencia PA-C.

## 2019-10-18 NOTE — HISTORY OF PRESENT ILLNESS
[FreeTextEntry1] : 63 yo  LMP 2006 referred by Dr. Avalos. Patient presented for an urgent visit to Dr. Avalos 2019 due to c/o brown staining for a week which then turned into vaginal bleeding. EMB  insufficient for diagnosis. Lpap: 2019 WNL. 19 D&C, hysteroscopy, polypectomy. EMC: focally complex endometrial hyperplasia associated with numerous fragments of endometrial polyp. She states she had post menopausal bleeding 2.5 years ago for which she had a D&C and needed no further workup. Patient denies pelvic pain. States she has not been weighed in a very long time.\par \par Of note patient is followed by Neurologist Rema Jay for MS diagnosed in , which has lead to quadriplegia. She is wheelchair/bed bound. She has a suprapubic catheter in place since , which is changed every 3 weeks. Her last dose of Ocrevus was 2019. She is recently . \par \par Lpap: 2019 WNL \par Lmammo: 12 years ago\par Lcolonoscopy: 3 years ago\par \par

## 2019-10-18 NOTE — END OF VISIT
[FreeTextEntry3] : Written by Dayan Valencia PA-C, acting as a scribe for Dr. Camilo Menard.\par This note accurately reflects the work and decisions made by me.\par

## 2019-12-01 ENCOUNTER — FORM ENCOUNTER (OUTPATIENT)
Age: 64
End: 2019-12-01

## 2019-12-02 ENCOUNTER — OUTPATIENT (OUTPATIENT)
Dept: OUTPATIENT SERVICES | Facility: HOSPITAL | Age: 64
LOS: 1 days | End: 2019-12-02
Payer: MEDICARE

## 2019-12-02 VITALS
DIASTOLIC BLOOD PRESSURE: 78 MMHG | HEIGHT: 60 IN | SYSTOLIC BLOOD PRESSURE: 140 MMHG | TEMPERATURE: 99 F | HEART RATE: 93 BPM | RESPIRATION RATE: 20 BRPM

## 2019-12-02 DIAGNOSIS — Z01.818 ENCOUNTER FOR OTHER PREPROCEDURAL EXAMINATION: ICD-10-CM

## 2019-12-02 DIAGNOSIS — Z98.890 OTHER SPECIFIED POSTPROCEDURAL STATES: Chronic | ICD-10-CM

## 2019-12-02 DIAGNOSIS — N85.00 ENDOMETRIAL HYPERPLASIA, UNSPECIFIED: ICD-10-CM

## 2019-12-02 DIAGNOSIS — Z29.9 ENCOUNTER FOR PROPHYLACTIC MEASURES, UNSPECIFIED: ICD-10-CM

## 2019-12-02 DIAGNOSIS — R94.31 ABNORMAL ELECTROCARDIOGRAM [ECG] [EKG]: ICD-10-CM

## 2019-12-02 DIAGNOSIS — E03.9 HYPOTHYROIDISM, UNSPECIFIED: ICD-10-CM

## 2019-12-02 LAB
ALBUMIN SERPL ELPH-MCNC: 3.6 G/DL — SIGNIFICANT CHANGE UP (ref 3.3–5.2)
ALP SERPL-CCNC: 78 U/L — SIGNIFICANT CHANGE UP (ref 40–120)
ALT FLD-CCNC: 33 U/L — HIGH
ANION GAP SERPL CALC-SCNC: 15 MMOL/L — SIGNIFICANT CHANGE UP (ref 5–17)
APTT BLD: 39.1 SEC — HIGH (ref 27.5–36.3)
AST SERPL-CCNC: 21 U/L — SIGNIFICANT CHANGE UP
BASOPHILS # BLD AUTO: 0.12 K/UL — SIGNIFICANT CHANGE UP (ref 0–0.2)
BASOPHILS NFR BLD AUTO: 0.9 % — SIGNIFICANT CHANGE UP (ref 0–2)
BILIRUB SERPL-MCNC: 0.2 MG/DL — LOW (ref 0.4–2)
BLD GP AB SCN SERPL QL: SIGNIFICANT CHANGE UP
BUN SERPL-MCNC: 13 MG/DL — SIGNIFICANT CHANGE UP (ref 8–20)
CALCIUM SERPL-MCNC: 9 MG/DL — SIGNIFICANT CHANGE UP (ref 8.6–10.2)
CHLORIDE SERPL-SCNC: 102 MMOL/L — SIGNIFICANT CHANGE UP (ref 98–107)
CO2 SERPL-SCNC: 25 MMOL/L — SIGNIFICANT CHANGE UP (ref 22–29)
CREAT SERPL-MCNC: 0.42 MG/DL — LOW (ref 0.5–1.3)
EOSINOPHIL # BLD AUTO: 0.19 K/UL — SIGNIFICANT CHANGE UP (ref 0–0.5)
EOSINOPHIL NFR BLD AUTO: 1.4 % — SIGNIFICANT CHANGE UP (ref 0–6)
GLUCOSE SERPL-MCNC: 97 MG/DL — SIGNIFICANT CHANGE UP (ref 70–115)
HBA1C BLD-MCNC: 5.9 % — HIGH (ref 4–5.6)
HCT VFR BLD CALC: 43.3 % — SIGNIFICANT CHANGE UP (ref 34.5–45)
HGB BLD-MCNC: 13.8 G/DL — SIGNIFICANT CHANGE UP (ref 11.5–15.5)
IMM GRANULOCYTES NFR BLD AUTO: 0.4 % — SIGNIFICANT CHANGE UP (ref 0–1.5)
INR BLD: 1.01 RATIO — SIGNIFICANT CHANGE UP (ref 0.88–1.16)
LYMPHOCYTES # BLD AUTO: 17.7 % — SIGNIFICANT CHANGE UP (ref 13–44)
LYMPHOCYTES # BLD AUTO: 2.39 K/UL — SIGNIFICANT CHANGE UP (ref 1–3.3)
MCHC RBC-ENTMCNC: 29.1 PG — SIGNIFICANT CHANGE UP (ref 27–34)
MCHC RBC-ENTMCNC: 31.9 GM/DL — LOW (ref 32–36)
MCV RBC AUTO: 91.4 FL — SIGNIFICANT CHANGE UP (ref 80–100)
MONOCYTES # BLD AUTO: 0.94 K/UL — HIGH (ref 0–0.9)
MONOCYTES NFR BLD AUTO: 7 % — SIGNIFICANT CHANGE UP (ref 2–14)
NEUTROPHILS # BLD AUTO: 9.79 K/UL — HIGH (ref 1.8–7.4)
NEUTROPHILS NFR BLD AUTO: 72.6 % — SIGNIFICANT CHANGE UP (ref 43–77)
PLATELET # BLD AUTO: 256 K/UL — SIGNIFICANT CHANGE UP (ref 150–400)
POTASSIUM SERPL-MCNC: 4 MMOL/L — SIGNIFICANT CHANGE UP (ref 3.5–5.3)
POTASSIUM SERPL-SCNC: 4 MMOL/L — SIGNIFICANT CHANGE UP (ref 3.5–5.3)
PROT SERPL-MCNC: 6.7 G/DL — SIGNIFICANT CHANGE UP (ref 6.6–8.7)
PROTHROM AB SERPL-ACNC: 11.6 SEC — SIGNIFICANT CHANGE UP (ref 10–12.9)
RBC # BLD: 4.74 M/UL — SIGNIFICANT CHANGE UP (ref 3.8–5.2)
RBC # FLD: 15.9 % — HIGH (ref 10.3–14.5)
SODIUM SERPL-SCNC: 142 MMOL/L — SIGNIFICANT CHANGE UP (ref 135–145)
WBC # BLD: 13.48 K/UL — HIGH (ref 3.8–10.5)
WBC # FLD AUTO: 13.48 K/UL — HIGH (ref 3.8–10.5)

## 2019-12-02 PROCEDURE — 93010 ELECTROCARDIOGRAM REPORT: CPT

## 2019-12-02 PROCEDURE — 71046 X-RAY EXAM CHEST 2 VIEWS: CPT

## 2019-12-02 PROCEDURE — 71046 X-RAY EXAM CHEST 2 VIEWS: CPT | Mod: 26

## 2019-12-02 PROCEDURE — 93005 ELECTROCARDIOGRAM TRACING: CPT

## 2019-12-02 PROCEDURE — G0463: CPT

## 2019-12-02 NOTE — H&P PST ADULT - ASSESSMENT
64 year old female present with c/o post menopausal bleeding. Patients daughter state she had a few episodes of spotting and bleeding. She underwent a D & C and endometrial polyp was removed 2019, patient's daughter state pathology reports had abnormal cells.  She is now schedule for robotic assisted total laparoscopic hysterectomy bilateral salpingo- oophorectomy sentinel lymph node mapping, cystoscopy, possible staging.     CAPRINI VTE 2.0 SCORE [CLOT updated 2019]    AGE RELATED RISK FACTORS                                                       MOBILITY RELATED FACTORS  [ ] Age 41-60 years                                            (1 Point)                    [ ] Bed rest                                                        (1 Point)  [x ] Age: 61-74 years                                           (2 Points)                  [ ] Plaster cast                                                   (2 Points)  [ ] Age= 75 years                                              (3 Points)                    [x ] Bed bound for more than 72 hours                 (2 Points)    DISEASE RELATED RISK FACTORS                                               GENDER SPECIFIC FACTORS  [x ] Edema in the lower extremities                       (1 Point)              [ ] Pregnancy                                                     (1 Point)  [ x] Varicose veins                                               (1 Point)                     [ ] Post-partum < 6 weeks                                   (1 Point)             [ x] BMI > 25 Kg/m2                                            (1 Point)                     [ ] Hormonal therapy  or oral contraception          (1 Point)                 [ ] Sepsis (in the previous month)                        (1 Point)               [ ] History of pregnancy complications                 (1 point)  [ ] Pneumonia or serious lung disease                                               [ ] Unexplained or recurrent                     (1 Point)           (in the previous month)                               (1 Point)  [ ] Abnormal pulmonary function test                     (1 Point)                 SURGERY RELATED RISK FACTORS  [ ] Acute myocardial infarction                              (1 Point)               [ ]  Section                                             (1 Point)  [ ] Congestive heart failure (in the previous month)  (1 Point)      [ ] Minor surgery                                                  (1 Point)   [ ] Inflammatory bowel disease                             (1 Point)               [ ] Arthroscopic surgery                                        (2 Points)  [ ] Central venous access                                      (2 Points)                [x ] General surgery lasting more than 45 minutes (2 points)  [ ] Malignancy- Present or previous                   (2 Points)                [ ] Elective arthroplasty                                         (5 points)    [ ] Stroke (in the previous month)                          (5 Points)                                                                                                                                                           HEMATOLOGY RELATED FACTORS                                                 TRAUMA RELATED RISK FACTORS  [ ] Prior episodes of VTE                                     (3 Points)                [ ] Fracture of the hip, pelvis, or leg                       (5 Points)  [ ] Positive family history for VTE                         (3 Points)             [ ] Acute spinal cord injury (in the previous month)  (5 Points)  [ ] Prothrombin 49260 A                                     (3 Points)               [ ] Paralysis  (less than 1 month)                             (5 Points)  [ ] Factor V Leiden                                             (3 Points)                  [ ] Multiple Trauma within 1 month                        (5 Points)  [ ] Lupus anticoagulants                                     (3 Points)                                                           [ ] Anticardiolipin antibodies                               (3 Points)                                                       [ ] High homocysteine in the blood                      (3 Points)                                             [ ] Other congenital or acquired thrombophilia      (3 Points)                                                [ ] Heparin induced thrombocytopenia                  (3 Points)                                     Total Score [    8  ]

## 2019-12-02 NOTE — H&P PST ADULT - EYES
08/19/19 0900   CM/SW Referral Data   Referral Source Physician   Reason for Referral Discharge planning;Psychoscial assessment   Informant THE [facility] Staff   Patient Info   Patient's Mental Status Confused   Patient's [address]   Number of José Miguel Mckeon EOMI; PERRL; no drainage or redness

## 2019-12-02 NOTE — H&P PST ADULT - NSICDXPASTMEDICALHX_GEN_ALL_CORE_FT
PAST MEDICAL HISTORY:  Cervical polyp     Chronic UTI     Constipation     Depression treated since 1999    Hypothyroid     Morbid obesity     Multiple Sclerosis since 1997    Post-menopausal bleeding     Suprapubic catheter     Wheelchair dependent

## 2019-12-02 NOTE — H&P PST ADULT - BLOOD TRANSFUSION, PREVIOUS, PROFILE
Anesthesia Evaluation     Patient summary reviewed and Nursing notes reviewed   NPO Solid Status: > 2 hours       Airway   Mallampati: II  Neck ROM: full  no difficulty expected  Dental - normal exam     Pulmonary     breath sounds clear to auscultation  (+) asthma,   Cardiovascular     Rhythm: regular    (+) valvular problems/murmurs,       Neuro/Psych  GI/Hepatic/Renal/Endo      Musculoskeletal     Abdominal    Substance History      OB/GYN    (+) Pregnant,         Other                                        Anesthesia Plan    ASA 2 - emergent     general     intravenous induction   Anesthetic plan and risks discussed with patient.      
no

## 2019-12-02 NOTE — PATIENT PROFILE ADULT - NSPROEDALEARNPREF_GEN_A_NUR
individual instruction/verbal instruction/Pre op teaching surgical scrub pain management instructions given to pt and her daughter by NP/written material

## 2019-12-02 NOTE — H&P PST ADULT - HISTORY OF PRESENT ILLNESS
64 year old female present with c/o post menopausal bleeding. Patients daughter state she had a few episodes of spotting and bleeding. She underwent a D & C and endometrial polyp was removed 9/2019, patient's daughter state pathology reports had abnormal cells.  She is now schedule for robotic assisted total laparoscopic hysterectomy bilateral salpingo- oophorectomy sentinel lymph node mapping, cystoscopy, possible staging.

## 2019-12-02 NOTE — H&P PST ADULT - I HAVE PERSONALLY SEEN AND EXAMINED THE PATIENT. THERE HAVE NOT BEEN ANY CHANGES IN THE PATIENT'S HISTORY OR EXAM UNLESS COMMENTED BELOW
Approved and signed.  On chart review when she was seeing Dr. Machado this was also for dysphagia  If she is still having any trouble swallowing, she should schedule a follow up with GI   Statement Selected

## 2019-12-02 NOTE — H&P PST ADULT - NSICDXPROBLEM_GEN_ALL_CORE_FT
PROBLEM DIAGNOSES  Problem: Hypothyroid  Assessment and Plan: continue medication as directed  medical clearance     Problem: Abnormal EKG  Assessment and Plan: EKG abnormal  cardiac clearance with Dr. Montanez [phone]    Problem: Need for prophylactic measure  Assessment and Plan: high risk, the surgical team will evaluate for appropriate VTE prophylaxis     Problem: Endometrium, hyperplasia  Assessment and Plan:  Robotic assisted total laparoscopic hysterectomy bilateral salpingo- oophorectomy sentinel lymph node mapping, cystoscopy, possible staging.

## 2019-12-02 NOTE — H&P PST ADULT - LAB RESULTS AND INTERPRETATION
S/kristian Bo in Dr. Menard office made aware of abnormal labs.   labs faxed to pcp and Dr. Menard office

## 2019-12-02 NOTE — H&P PST ADULT - NSICDXPASTSURGICALHX_GEN_ALL_CORE_FT
PAST SURGICAL HISTORY:  Fracture Bone left foot 5th digit repaired surgically 2002    History of bladder surgery insertion of suprapubic cather 2012?    Presence of intrathecal baclofen pump 2010    S/P colonoscopy     S/P Dilatation and Curettage 2001, 2016    S/P Tonsillectomy 1963 PAST SURGICAL HISTORY:  Fracture Bone left foot 5th digit repaired surgically 2002    History of bladder surgery insertion of suprapubic catheter 2012?    Presence of intrathecal baclofen pump 2010    S/P colonoscopy     S/P Dilatation and Curettage 2001, 2016    S/P Tonsillectomy 1963

## 2019-12-02 NOTE — H&P PST ADULT - RS GEN PE MLT RESP DETAILS PC
respirations non-labored/breath sounds equal/no chest wall tenderness/good air movement/airway patent/clear to auscultation bilaterally

## 2019-12-03 RX ORDER — CEFOTETAN DISODIUM 1 G
2 VIAL (EA) INJECTION ONCE
Refills: 0 | Status: COMPLETED | OUTPATIENT
Start: 2019-12-10 | End: 2019-12-10

## 2019-12-03 RX ORDER — SODIUM CHLORIDE 9 MG/ML
3 INJECTION INTRAMUSCULAR; INTRAVENOUS; SUBCUTANEOUS EVERY 8 HOURS
Refills: 0 | Status: DISCONTINUED | OUTPATIENT
Start: 2019-12-10 | End: 2019-12-11

## 2019-12-06 ENCOUNTER — OUTPATIENT (OUTPATIENT)
Dept: OUTPATIENT SERVICES | Facility: HOSPITAL | Age: 64
LOS: 1 days | End: 2019-12-06
Payer: MEDICARE

## 2019-12-06 DIAGNOSIS — Z98.890 OTHER SPECIFIED POSTPROCEDURAL STATES: Chronic | ICD-10-CM

## 2019-12-06 DIAGNOSIS — R94.31 ABNORMAL ELECTROCARDIOGRAM [ECG] [EKG]: ICD-10-CM

## 2019-12-06 PROCEDURE — A9500: CPT

## 2019-12-06 PROCEDURE — 78452 HT MUSCLE IMAGE SPECT MULT: CPT

## 2019-12-06 PROCEDURE — 93016 CV STRESS TEST SUPVJ ONLY: CPT

## 2019-12-06 PROCEDURE — 93017 CV STRESS TEST TRACING ONLY: CPT

## 2019-12-06 PROCEDURE — 93018 CV STRESS TEST I&R ONLY: CPT

## 2019-12-06 PROCEDURE — 78452 HT MUSCLE IMAGE SPECT MULT: CPT | Mod: 26

## 2019-12-10 ENCOUNTER — RESULT REVIEW (OUTPATIENT)
Age: 64
End: 2019-12-10

## 2019-12-10 ENCOUNTER — TRANSCRIPTION ENCOUNTER (OUTPATIENT)
Age: 64
End: 2019-12-10

## 2019-12-10 ENCOUNTER — INPATIENT (INPATIENT)
Facility: HOSPITAL | Age: 64
LOS: 0 days | Discharge: HOME | DRG: 734 | End: 2019-12-11
Attending: OBSTETRICS & GYNECOLOGY | Admitting: OBSTETRICS & GYNECOLOGY
Payer: MEDICARE

## 2019-12-10 VITALS
WEIGHT: 250 LBS | SYSTOLIC BLOOD PRESSURE: 132 MMHG | RESPIRATION RATE: 16 BRPM | HEIGHT: 60 IN | TEMPERATURE: 99 F | DIASTOLIC BLOOD PRESSURE: 74 MMHG | OXYGEN SATURATION: 100 % | HEART RATE: 72 BPM

## 2019-12-10 DIAGNOSIS — Z98.890 OTHER SPECIFIED POSTPROCEDURAL STATES: Chronic | ICD-10-CM

## 2019-12-10 DIAGNOSIS — G35 MULTIPLE SCLEROSIS: ICD-10-CM

## 2019-12-10 DIAGNOSIS — F32.9 MAJOR DEPRESSIVE DISORDER, SINGLE EPISODE, UNSPECIFIED: ICD-10-CM

## 2019-12-10 DIAGNOSIS — N85.00 ENDOMETRIAL HYPERPLASIA, UNSPECIFIED: ICD-10-CM

## 2019-12-10 LAB
GLUCOSE BLDC GLUCOMTR-MCNC: 71 MG/DL — SIGNIFICANT CHANGE UP (ref 70–99)
GLUCOSE BLDC GLUCOMTR-MCNC: 80 MG/DL — SIGNIFICANT CHANGE UP (ref 70–99)
GLUCOSE BLDC GLUCOMTR-MCNC: 81 MG/DL — SIGNIFICANT CHANGE UP (ref 70–99)

## 2019-12-10 PROCEDURE — 38900 IO MAP OF SENT LYMPH NODE: CPT | Mod: 80,50

## 2019-12-10 PROCEDURE — 58571 TLH W/T/O 250 G OR LESS: CPT | Mod: 80

## 2019-12-10 PROCEDURE — 58571 TLH W/T/O 250 G OR LESS: CPT

## 2019-12-10 PROCEDURE — 38900 IO MAP OF SENT LYMPH NODE: CPT | Mod: 50

## 2019-12-10 PROCEDURE — 49084 PERITONEAL LAVAGE: CPT | Mod: 59

## 2019-12-10 PROCEDURE — 88342 IMHCHEM/IMCYTCHM 1ST ANTB: CPT | Mod: 26

## 2019-12-10 PROCEDURE — 38570 LAPAROSCOPY LYMPH NODE BIOP: CPT | Mod: 80

## 2019-12-10 PROCEDURE — 88305 TISSUE EXAM BY PATHOLOGIST: CPT | Mod: 26

## 2019-12-10 PROCEDURE — 88112 CYTOPATH CELL ENHANCE TECH: CPT | Mod: 26

## 2019-12-10 PROCEDURE — 88307 TISSUE EXAM BY PATHOLOGIST: CPT | Mod: 26

## 2019-12-10 PROCEDURE — 38570 LAPAROSCOPY LYMPH NODE BIOP: CPT

## 2019-12-10 RX ORDER — BACLOFEN 100 %
1 POWDER (GRAM) MISCELLANEOUS
Qty: 0 | Refills: 0 | DISCHARGE

## 2019-12-10 RX ORDER — OXYCODONE AND ACETAMINOPHEN 5; 325 MG/1; MG/1
1 TABLET ORAL EVERY 4 HOURS
Refills: 0 | Status: DISCONTINUED | OUTPATIENT
Start: 2019-12-10 | End: 2019-12-10

## 2019-12-10 RX ORDER — CHOLECALCIFEROL (VITAMIN D3) 125 MCG
1 CAPSULE ORAL
Qty: 0 | Refills: 0 | DISCHARGE

## 2019-12-10 RX ORDER — ONDANSETRON 8 MG/1
4 TABLET, FILM COATED ORAL ONCE
Refills: 0 | Status: DISCONTINUED | OUTPATIENT
Start: 2019-12-10 | End: 2019-12-10

## 2019-12-10 RX ORDER — NYSTATIN CREAM 100000 [USP'U]/G
1 CREAM TOPICAL
Refills: 0 | Status: DISCONTINUED | OUTPATIENT
Start: 2019-12-10 | End: 2019-12-11

## 2019-12-10 RX ORDER — BACLOFEN 100 %
10 POWDER (GRAM) MISCELLANEOUS THREE TIMES A DAY
Refills: 0 | Status: DISCONTINUED | OUTPATIENT
Start: 2019-12-10 | End: 2019-12-11

## 2019-12-10 RX ORDER — ONDANSETRON 8 MG/1
4 TABLET, FILM COATED ORAL EVERY 6 HOURS
Refills: 0 | Status: DISCONTINUED | OUTPATIENT
Start: 2019-12-10 | End: 2019-12-11

## 2019-12-10 RX ORDER — LEVOTHYROXINE SODIUM 125 MCG
1 TABLET ORAL
Qty: 0 | Refills: 0 | DISCHARGE

## 2019-12-10 RX ORDER — CEFOTETAN DISODIUM 1 G
1 VIAL (EA) INJECTION ONCE
Refills: 0 | Status: COMPLETED | OUTPATIENT
Start: 2019-12-10 | End: 2019-12-10

## 2019-12-10 RX ORDER — TRAMADOL HYDROCHLORIDE 50 MG/1
25 TABLET ORAL EVERY 4 HOURS
Refills: 0 | Status: DISCONTINUED | OUTPATIENT
Start: 2019-12-10 | End: 2019-12-11

## 2019-12-10 RX ORDER — BUPROPION HYDROCHLORIDE 150 MG/1
200 TABLET, EXTENDED RELEASE ORAL
Refills: 0 | Status: DISCONTINUED | OUTPATIENT
Start: 2019-12-10 | End: 2019-12-11

## 2019-12-10 RX ORDER — LEVOTHYROXINE SODIUM 125 MCG
50 TABLET ORAL DAILY
Refills: 0 | Status: DISCONTINUED | OUTPATIENT
Start: 2019-12-10 | End: 2019-12-11

## 2019-12-10 RX ORDER — OCRELIZUMAB 300 MG/10ML
1 INJECTION INTRAVENOUS
Qty: 0 | Refills: 0 | DISCHARGE

## 2019-12-10 RX ORDER — OXYCODONE HYDROCHLORIDE 5 MG/1
1 TABLET ORAL
Qty: 20 | Refills: 0
Start: 2019-12-10 | End: 2019-12-14

## 2019-12-10 RX ORDER — OXYCODONE AND ACETAMINOPHEN 5; 325 MG/1; MG/1
2 TABLET ORAL EVERY 4 HOURS
Refills: 0 | Status: DISCONTINUED | OUTPATIENT
Start: 2019-12-10 | End: 2019-12-10

## 2019-12-10 RX ORDER — KETOROLAC TROMETHAMINE 30 MG/ML
30 SYRINGE (ML) INJECTION EVERY 6 HOURS
Refills: 0 | Status: DISCONTINUED | OUTPATIENT
Start: 2019-12-10 | End: 2019-12-11

## 2019-12-10 RX ORDER — SODIUM CHLORIDE 9 MG/ML
1000 INJECTION, SOLUTION INTRAVENOUS
Refills: 0 | Status: DISCONTINUED | OUTPATIENT
Start: 2019-12-10 | End: 2019-12-10

## 2019-12-10 RX ORDER — FENTANYL CITRATE 50 UG/ML
25 INJECTION INTRAVENOUS
Refills: 0 | Status: DISCONTINUED | OUTPATIENT
Start: 2019-12-10 | End: 2019-12-10

## 2019-12-10 RX ORDER — TRAMADOL HYDROCHLORIDE 50 MG/1
50 TABLET ORAL EVERY 4 HOURS
Refills: 0 | Status: DISCONTINUED | OUTPATIENT
Start: 2019-12-10 | End: 2019-12-11

## 2019-12-10 RX ORDER — ASPIRIN/CALCIUM CARB/MAGNESIUM 324 MG
1 TABLET ORAL
Qty: 0 | Refills: 0 | DISCHARGE

## 2019-12-10 RX ORDER — DEXTROSE MONOHYDRATE, SODIUM CHLORIDE, AND POTASSIUM CHLORIDE 50; .745; 4.5 G/1000ML; G/1000ML; G/1000ML
1000 INJECTION, SOLUTION INTRAVENOUS
Refills: 0 | Status: DISCONTINUED | OUTPATIENT
Start: 2019-12-10 | End: 2019-12-11

## 2019-12-10 RX ORDER — ENOXAPARIN SODIUM 100 MG/ML
40 INJECTION SUBCUTANEOUS EVERY 12 HOURS
Refills: 0 | Status: DISCONTINUED | OUTPATIENT
Start: 2019-12-10 | End: 2019-12-11

## 2019-12-10 RX ORDER — ENOXAPARIN SODIUM 100 MG/ML
40 INJECTION SUBCUTANEOUS
Qty: 11.2 | Refills: 0
Start: 2019-12-10 | End: 2019-12-23

## 2019-12-10 RX ORDER — BACLOFEN 100 %
0 POWDER (GRAM) MISCELLANEOUS
Qty: 0 | Refills: 0 | DISCHARGE

## 2019-12-10 RX ADMIN — SODIUM CHLORIDE 3 MILLILITER(S): 9 INJECTION INTRAMUSCULAR; INTRAVENOUS; SUBCUTANEOUS at 23:09

## 2019-12-10 RX ADMIN — Medication 100 GRAM(S): at 20:37

## 2019-12-10 RX ADMIN — Medication 30 MILLIGRAM(S): at 23:47

## 2019-12-10 RX ADMIN — BUPROPION HYDROCHLORIDE 200 MILLIGRAM(S): 150 TABLET, EXTENDED RELEASE ORAL at 18:07

## 2019-12-10 RX ADMIN — Medication 10 MILLIGRAM(S): at 18:07

## 2019-12-10 RX ADMIN — NYSTATIN CREAM 1 APPLICATION(S): 100000 CREAM TOPICAL at 23:47

## 2019-12-10 RX ADMIN — SODIUM CHLORIDE 3 MILLILITER(S): 9 INJECTION INTRAMUSCULAR; INTRAVENOUS; SUBCUTANEOUS at 15:30

## 2019-12-10 RX ADMIN — Medication 30 MILLIGRAM(S): at 18:08

## 2019-12-10 RX ADMIN — ENOXAPARIN SODIUM 40 MILLIGRAM(S): 100 INJECTION SUBCUTANEOUS at 18:07

## 2019-12-10 RX ADMIN — Medication 30 MILLIGRAM(S): at 18:07

## 2019-12-10 NOTE — PROGRESS NOTE ADULT - ASSESSMENT
64 year old female with MS s/p RA TLH BSO BL [place] PLND cysto for endometrial hyperplasia admitted for observation, recovering well post operatively.

## 2019-12-10 NOTE — BRIEF OPERATIVE NOTE - OPERATION/FINDINGS
grossly normal uterus, fallopian tubes, and ovaries bilaterally. Normal urothelium, bilateral jets seen, suprapubic catheter visualized grossly normal uterus, fallopian tubes, and ovaries bilaterally. Normal urothelium, bilateral jets seen, suprapubic catheter visualized. Normal upper adomen. No adhesions.

## 2019-12-10 NOTE — DISCHARGE NOTE PROVIDER - NSDCFUADDINST_GEN_ALL_CORE_FT
Please contact your provider for any pain uncontrolled by medication, excessive bleeding or Fever>100.4  Please take Naprosyn 1 tablet every 12 hours x 3 days, may take percocet as prescribed for breakthrough pain.   Please take Lovenox as instructed Please take Lovenox as instructed

## 2019-12-10 NOTE — DISCHARGE NOTE PROVIDER - NSDCFUADDAPPT_GEN_ALL_CORE_FT
Please follow-up with PA in one week for post-op visit/removal of sutures.  Follow-up with Dr. Menard in two weeks to review pathology report. Please follow-up with PA in one week for post-op visit/removal of sutures.  Follow-up with Dr. Menard in two weeks to review pathology report.    Please contact your provider for any pain uncontrolled by medication, excessive bleeding or Fever>100.4  Please take naproxen-1 tablet every 12 hours x 3 days, may take tramadol as prescribed for breakthrough pain.

## 2019-12-10 NOTE — DISCHARGE NOTE PROVIDER - CARE PROVIDER_API CALL
Camilo Menard)  Gynecologic Oncology; Obstetrics and Gynecology  [address] [place] [address]  Phone: [phone]  Fax: [phone]  Follow Up Time:

## 2019-12-10 NOTE — PROGRESS NOTE ADULT - PROBLEM SELECTOR PLAN 1
s/p procedure above  IVF at 125 until AM  Regular diet  Incentive spirometer  Suprapubic catheter in place to remain  Toradol and Tramadol for pain as patient reports vomiting with Oxycodone.  Zofran for nausea  Bedridden state due to Multiple Sclerosis  Post op Antibiotics x 1  Lovenox while in house and outpatient x 2 weeks  AM Labs

## 2019-12-10 NOTE — DISCHARGE NOTE PROVIDER - HOSPITAL COURSE
Patient post-operatively had an uncomplicated hospital course. Her pain was well controlled. She is tolerating a regular diet. She is ambulating independently. She was able to void after removal of robles. Patient with flatus. Labs and Vitals WNL upon discharge. Patient post-operatively had an uncomplicated hospital course. Her pain was well controlled. She is tolerating a regular diet. She was able to void after removal of robles. Labs and Vitals WNL upon discharge.

## 2019-12-10 NOTE — CHART NOTE - NSCHARTNOTEFT_GEN_A_CORE
Patient evaluated at bedside. Patient has areas of moisture and redness in skin folds. She states she uses nystatin powder at home usually. Will write for it here.     Patient has SCD bilaterally. Discussed with aj PENALOZA about getting her inflation boots to help prevent pressure sores.

## 2019-12-10 NOTE — DISCHARGE NOTE PROVIDER - NSDCMRMEDTOKEN_GEN_ALL_CORE_FT
acetaminophen-oxycodone 325 mg-5 mg oral tablet: 1 tab(s) orally every 6 hours, As Needed -for severe pain MDD:4 tablets  baclofen: via pump  baclofen 10 mg oral tablet: 1 tab(s) orally 3 times a day, As Needed  buPROPion 200 mg/12 hours (SR) oral tablet, extended release: 1 tab(s) orally 2 times a day  Calcium 600+D 600 mg-200 intl units oral tablet: 1 tab(s) orally once a day  levothyroxine 50 mcg (0.05 mg) oral tablet: 1 tab(s) orally once a day  Lipotropic with Multivitamins oral tablet: 1 tab(s) orally once a day  Naprosyn 500 mg oral tablet: 1 tab(s) orally 2 times a day   Ocrevus 300 mg/10 mL intravenous solution: 1  intravenous every 6 months  Vitamin D3 1000 intl units oral tablet: 1 tab(s) orally once a day baclofen: via pump  baclofen 10 mg oral tablet: 1 tab(s) orally 3 times a day, As Needed  buPROPion 200 mg/12 hours (SR) oral tablet, extended release: 1 tab(s) orally 2 times a day  Calcium 600+D 600 mg-200 intl units oral tablet: 1 tab(s) orally once a day  levothyroxine 50 mcg (0.05 mg) oral tablet: 1 tab(s) orally once a day  Lipotropic with Multivitamins oral tablet: 1 tab(s) orally once a day  Lovenox 40 mg/0.4 mL injectable solution: 40 milligram(s) subcutaneously 2 times a day x 14 days   Naprosyn 500 mg oral tablet: 1 tab(s) orally 2 times a day   Ocrevus 300 mg/10 mL intravenous solution: 1  intravenous every 6 months  traMADol 50 mg oral tablet: 1 tab(s) orally every 6 hours, As Needed -for severe pain MDD:4 tabs  Vitamin D3 1000 intl units oral tablet: 1 tab(s) orally once a day

## 2019-12-10 NOTE — PROGRESS NOTE ADULT - SUBJECTIVE AND OBJECTIVE BOX
GYNECOLOGIC ONCOLOGY PROGRESS NOTE    POD#0    PROBLEMS:  Hypothyroid  Abnormal EKG  Need for prophylactic measure  Endometrium, hyperplasia      Pt seen and examined at bedside.     SUBJECTIVE:    Patient is without complaints.  Pain well-controlled.  Denies Nausea, Vomiting or Diarrhea.  Denies shortness of breath, chest pain or dyspnea on exertion.  Tolerating water/ice chips, willing to try regular diet.    OBJECTIVE:     VITALS:  T(F): 98 (12-10-19 @ 11:55), Max: 98.9 (12-10-19 @ 09:01)  HR: 74 (12-10-19 @ 13:42) (67 - 74)  BP: 131/77 (12-10-19 @ 13:42) (123/73 - 136/82)  RR: 16 (12-10-19 @ 13:42) (14 - 17)  SpO2: 100% (12-10-19 @ 13:42) (99% - 100%)    I&O's Summary    10 Dec 2019 07:01  -  10 Dec 2019 14:46  --------------------------------------------------------  IN: 0 mL / OUT: 500 mL / NET: -500 mL  Suprapubic catheter in place with since OR.      MEDICATIONS  (STANDING):  baclofen 10 milliGRAM(s) Oral three times a day  buPROPion  milliGRAM(s) Oral two times a day  cefoTEtan  IVPB 1 Gram(s) IV Intermittent once  cefoTEtan  IVPB 2 Gram(s) IV Intermittent once  dextrose 5% + sodium chloride 0.45% with potassium chloride 20 mEq/L 1000 milliLiter(s) (125 mL/Hr) IV Continuous <Continuous>  enoxaparin Injectable 40 milliGRAM(s) SubCutaneous every 12 hours  ketorolac   Injectable 30 milliGRAM(s) IV Push every 6 hours  lactated ringers. 1000 milliLiter(s) (100 mL/Hr) IV Continuous <Continuous>  levothyroxine 50 MICROGram(s) Oral daily  sodium chloride 0.9% lock flush 3 milliLiter(s) IV Push every 8 hours    MEDICATIONS  (PRN):  fentaNYL    Injectable 25 MICROGram(s) IV Push every 5 minutes PRN Moderate Pain (4 - 6)  ondansetron Injectable 4 milliGRAM(s) IV Push every 6 hours PRN Postoperative Nausea and/or Vomiting  ondansetron Injectable 4 milliGRAM(s) IV Push once PRN Nausea and/or Vomiting  oxycodone    5 mG/acetaminophen 325 mG 1 Tablet(s) Oral every 4 hours PRN Moderate Pain (4 - 6)  oxycodone    5 mG/acetaminophen 325 mG 2 Tablet(s) Oral every 4 hours PRN Severe Pain (7 - 10)      Physical Exam:  Constitutional: NAD  Pulmonary: clear to auscultation bilaterally   Cardiovascular: Regular rate and rhythm   Abdomen: soft, non-tender, non-distended, hypoactive bowel sounds  Extremities: no lower extremity edema or calve tenderness, Zainab's sign negative. Patient reports she is able to feel squeeze during Zainab's test but reports no pain.   Incision: Clean, dry, op sites intact.  Without signs of infection or hernia.      LABS:                RADIOLOGY & ADDITIONAL TESTS: GYNECOLOGIC ONCOLOGY PROGRESS NOTE    POD#0    PROBLEMS:  Hypothyroid  Abnormal EKG  Need for prophylactic measure  Endometrium, hyperplasia      Pt seen and examined at bedside.     SUBJECTIVE:    Patient is without complaints.  Pain well-controlled.  Denies Nausea, Vomiting or Diarrhea.  Denies shortness of breath, chest pain or dyspnea on exertion.  Tolerating water/ice chips, willing to try regular diet.    OBJECTIVE:     VITALS:  T(F): 98 (12-10-19 @ 11:55), Max: 98.9 (12-10-19 @ 09:01)  HR: 74 (12-10-19 @ 13:42) (67 - 74)  BP: 131/77 (12-10-19 @ 13:42) (123/73 - 136/82)  RR: 16 (12-10-19 @ 13:42) (14 - 17)  SpO2: 100% (12-10-19 @ 13:42) (99% - 100%)    I&O's Summary    10 Dec 2019 07:01  -  10 Dec 2019 14:46  --------------------------------------------------------  IN: 0 mL / OUT: 500 mL / NET: -500 mL  Suprapubic catheter in place with 600 cc urine since OR.      MEDICATIONS  (STANDING):  baclofen 10 milliGRAM(s) Oral three times a day  buPROPion  milliGRAM(s) Oral two times a day  cefoTEtan  IVPB 1 Gram(s) IV Intermittent once  cefoTEtan  IVPB 2 Gram(s) IV Intermittent once  dextrose 5% + sodium chloride 0.45% with potassium chloride 20 mEq/L 1000 milliLiter(s) (125 mL/Hr) IV Continuous <Continuous>  enoxaparin Injectable 40 milliGRAM(s) SubCutaneous every 12 hours  ketorolac   Injectable 30 milliGRAM(s) IV Push every 6 hours  lactated ringers. 1000 milliLiter(s) (100 mL/Hr) IV Continuous <Continuous>  levothyroxine 50 MICROGram(s) Oral daily  sodium chloride 0.9% lock flush 3 milliLiter(s) IV Push every 8 hours    MEDICATIONS  (PRN):  fentaNYL    Injectable 25 MICROGram(s) IV Push every 5 minutes PRN Moderate Pain (4 - 6)  ondansetron Injectable 4 milliGRAM(s) IV Push every 6 hours PRN Postoperative Nausea and/or Vomiting  ondansetron Injectable 4 milliGRAM(s) IV Push once PRN Nausea and/or Vomiting  oxycodone    5 mG/acetaminophen 325 mG 1 Tablet(s) Oral every 4 hours PRN Moderate Pain (4 - 6)  oxycodone    5 mG/acetaminophen 325 mG 2 Tablet(s) Oral every 4 hours PRN Severe Pain (7 - 10)      Physical Exam:  Constitutional: NAD  Pulmonary: clear to auscultation bilaterally   Cardiovascular: Regular rate and rhythm   Abdomen: soft, non-tender, non-distended, hypoactive bowel sounds  Extremities: no lower extremity edema or calve tenderness, Zainab's sign negative. Patient reports she is able to feel squeeze during Zainab's test but reports no pain.   Incision: Clean, dry, op sites intact.  Without signs of infection or hernia.      LABS:                RADIOLOGY & ADDITIONAL TESTS:

## 2019-12-10 NOTE — DISCHARGE NOTE PROVIDER - REASON FOR ADMISSION
surgery - robotic assisted total laparoscopic hysterectomy, bilateral salpingo-oophorectomy, sentinel lymph node mapping, cystoscopy

## 2019-12-10 NOTE — BRIEF OPERATIVE NOTE - NSICDXBRIEFPROCEDURE_GEN_ALL_CORE_FT
PROCEDURES:  Hysterectomy, total, with BSO, and bilateral pelvic lymph node dissection, robot assisted 10-Dec-2019 11:31:33  Camilo Espinosa PROCEDURES:  [place] lymph node mapping 10-Dec-2019 11:37:22  Camilo Espinosa  Hysterectomy, total, with BSO, and bilateral pelvic lymph node dissection, robot assisted 10-Dec-2019 11:31:33  Camilo Espinosa

## 2019-12-10 NOTE — DISCHARGE NOTE PROVIDER - INSTRUCTIONS
Continue regular diet     May walk and climb stairs as often as you would like, no vigorous activity, do not lift anything greater than 10lbs, nothing per vagina x 6 weeks, do not drive while on pain medication. Continue regular diet     Nothing per vagina x 6 weeks.

## 2019-12-10 NOTE — BRIEF OPERATIVE NOTE - SPECIMENS
uterus/cervix/bilateral fallopian tubes/bilateral ovaries, left and right pelvic sentinel lymph nodes uterus/cervix/bilateral fallopian tubes/bilateral ovaries, left and right pelvic sentinel lymph nodes, pelvic washing

## 2019-12-11 ENCOUNTER — TRANSCRIPTION ENCOUNTER (OUTPATIENT)
Age: 64
End: 2019-12-11

## 2019-12-11 VITALS
RESPIRATION RATE: 18 BRPM | HEART RATE: 61 BPM | TEMPERATURE: 98 F | DIASTOLIC BLOOD PRESSURE: 66 MMHG | SYSTOLIC BLOOD PRESSURE: 97 MMHG | OXYGEN SATURATION: 98 %

## 2019-12-11 LAB
ANION GAP SERPL CALC-SCNC: 12 MMOL/L — SIGNIFICANT CHANGE UP (ref 5–17)
BASOPHILS # BLD AUTO: 0.02 K/UL — SIGNIFICANT CHANGE UP (ref 0–0.2)
BASOPHILS NFR BLD AUTO: 0.2 % — SIGNIFICANT CHANGE UP (ref 0–2)
BUN SERPL-MCNC: 9 MG/DL — SIGNIFICANT CHANGE UP (ref 8–20)
CALCIUM SERPL-MCNC: 8.6 MG/DL — SIGNIFICANT CHANGE UP (ref 8.6–10.2)
CHLORIDE SERPL-SCNC: 108 MMOL/L — HIGH (ref 98–107)
CO2 SERPL-SCNC: 23 MMOL/L — SIGNIFICANT CHANGE UP (ref 22–29)
CREAT SERPL-MCNC: 0.3 MG/DL — LOW (ref 0.5–1.3)
EOSINOPHIL # BLD AUTO: 0 K/UL — SIGNIFICANT CHANGE UP (ref 0–0.5)
EOSINOPHIL NFR BLD AUTO: 0 % — SIGNIFICANT CHANGE UP (ref 0–6)
GLUCOSE SERPL-MCNC: 165 MG/DL — HIGH (ref 70–115)
HCT VFR BLD CALC: 39 % — SIGNIFICANT CHANGE UP (ref 34.5–45)
HGB BLD-MCNC: 12.1 G/DL — SIGNIFICANT CHANGE UP (ref 11.5–15.5)
IMM GRANULOCYTES NFR BLD AUTO: 0.5 % — SIGNIFICANT CHANGE UP (ref 0–1.5)
LYMPHOCYTES # BLD AUTO: 1.4 K/UL — SIGNIFICANT CHANGE UP (ref 1–3.3)
LYMPHOCYTES # BLD AUTO: 12.3 % — LOW (ref 13–44)
MAGNESIUM SERPL-MCNC: 2.3 MG/DL — SIGNIFICANT CHANGE UP (ref 1.8–2.6)
MCHC RBC-ENTMCNC: 28.5 PG — SIGNIFICANT CHANGE UP (ref 27–34)
MCHC RBC-ENTMCNC: 31 GM/DL — LOW (ref 32–36)
MCV RBC AUTO: 91.8 FL — SIGNIFICANT CHANGE UP (ref 80–100)
MONOCYTES # BLD AUTO: 0.82 K/UL — SIGNIFICANT CHANGE UP (ref 0–0.9)
MONOCYTES NFR BLD AUTO: 7.2 % — SIGNIFICANT CHANGE UP (ref 2–14)
NEUTROPHILS # BLD AUTO: 9.1 K/UL — HIGH (ref 1.8–7.4)
NEUTROPHILS NFR BLD AUTO: 79.8 % — HIGH (ref 43–77)
PHOSPHATE SERPL-MCNC: 2.4 MG/DL — SIGNIFICANT CHANGE UP (ref 2.4–4.7)
PLATELET # BLD AUTO: 355 K/UL — SIGNIFICANT CHANGE UP (ref 150–400)
POTASSIUM SERPL-MCNC: 4.2 MMOL/L — SIGNIFICANT CHANGE UP (ref 3.5–5.3)
POTASSIUM SERPL-SCNC: 4.2 MMOL/L — SIGNIFICANT CHANGE UP (ref 3.5–5.3)
RBC # BLD: 4.25 M/UL — SIGNIFICANT CHANGE UP (ref 3.8–5.2)
RBC # FLD: 15.4 % — HIGH (ref 10.3–14.5)
SODIUM SERPL-SCNC: 143 MMOL/L — SIGNIFICANT CHANGE UP (ref 135–145)
WBC # BLD: 11.4 K/UL — HIGH (ref 3.8–10.5)
WBC # FLD AUTO: 11.4 K/UL — HIGH (ref 3.8–10.5)

## 2019-12-11 PROCEDURE — S2900: CPT

## 2019-12-11 PROCEDURE — 36415 COLL VENOUS BLD VENIPUNCTURE: CPT

## 2019-12-11 PROCEDURE — 83735 ASSAY OF MAGNESIUM: CPT

## 2019-12-11 PROCEDURE — 80048 BASIC METABOLIC PNL TOTAL CA: CPT

## 2019-12-11 PROCEDURE — 84100 ASSAY OF PHOSPHORUS: CPT

## 2019-12-11 PROCEDURE — 88305 TISSUE EXAM BY PATHOLOGIST: CPT

## 2019-12-11 PROCEDURE — 85027 COMPLETE CBC AUTOMATED: CPT

## 2019-12-11 PROCEDURE — 88342 IMHCHEM/IMCYTCHM 1ST ANTB: CPT

## 2019-12-11 PROCEDURE — 82962 GLUCOSE BLOOD TEST: CPT

## 2019-12-11 PROCEDURE — 88112 CYTOPATH CELL ENHANCE TECH: CPT

## 2019-12-11 PROCEDURE — 88307 TISSUE EXAM BY PATHOLOGIST: CPT

## 2019-12-11 RX ORDER — TRAMADOL HYDROCHLORIDE 50 MG/1
1 TABLET ORAL
Qty: 12 | Refills: 0
Start: 2019-12-11 | End: 2019-12-13

## 2019-12-11 RX ORDER — TRAMADOL HYDROCHLORIDE 50 MG/1
1 TABLET ORAL
Qty: 20 | Refills: 0
Start: 2019-12-11 | End: 2019-12-15

## 2019-12-11 RX ADMIN — Medication 50 MICROGRAM(S): at 05:39

## 2019-12-11 RX ADMIN — Medication 30 MILLIGRAM(S): at 00:02

## 2019-12-11 RX ADMIN — SODIUM CHLORIDE 3 MILLILITER(S): 9 INJECTION INTRAMUSCULAR; INTRAVENOUS; SUBCUTANEOUS at 05:37

## 2019-12-11 RX ADMIN — Medication 30 MILLIGRAM(S): at 05:54

## 2019-12-11 RX ADMIN — Medication 30 MILLIGRAM(S): at 05:39

## 2019-12-11 RX ADMIN — NYSTATIN CREAM 1 APPLICATION(S): 100000 CREAM TOPICAL at 05:39

## 2019-12-11 RX ADMIN — ENOXAPARIN SODIUM 40 MILLIGRAM(S): 100 INJECTION SUBCUTANEOUS at 05:39

## 2019-12-11 NOTE — DISCHARGE NOTE NURSING/CASE MANAGEMENT/SOCIAL WORK - PATIENT PORTAL LINK FT
You can access the FollowMyHealth Patient Portal offered by [facility] by registering at the following website: [email or web address]. By joining [employer]’s FollowMyHealth portal, you will also be able to view your health information using other applications (apps) compatible with our system.

## 2019-12-11 NOTE — DISCHARGE NOTE NURSING/CASE MANAGEMENT/SOCIAL WORK - NSDCFUADDAPPT_GEN_ALL_CORE_FT
Please follow-up with PA in one week for post-op visit/removal of sutures.  Follow-up with Dr. Menard in two weeks to review pathology report.    Please contact your provider for any pain uncontrolled by medication, excessive bleeding or Fever>100.4  Please take naproxen-1 tablet every 12 hours x 3 days, may take tramadol as prescribed for breakthrough pain.

## 2019-12-11 NOTE — PROGRESS NOTE ADULT - ATTENDING COMMENTS
Patient seen and examined.  Recovering well.  OR procedure/findings d/w patient.  Okay for discharge.  Instructions discussed.

## 2019-12-11 NOTE — PROGRESS NOTE ADULT - ASSESSMENT
64y s/p robot assisted total laparoscopic hysterectomy, bilateral salpingooophorectomy, sentinel lymph node dissection, and cystoscopy, POD #1, HD #2.    Neuro: Quadriplegia secondary to MS. Reports muscle spasms have improved from their baseline - Baclofen pump working. Pain well controlled with PRN pain medication.  GI: Tolerating regular diet.  : Suprapubic catheter in place. Urine output adequate.  Heme: SCDs when in bed. Lovenox for VTE prophylaxis. Pre-op Hgb 13.8. Repeat CBC this AM pending. VSS.   Dispo: anticipate DC today pending AM labs.

## 2019-12-11 NOTE — PROGRESS NOTE ADULT - SUBJECTIVE AND OBJECTIVE BOX
Name: BRIAN DICKINSON  MRN: [id number]  Date Admitted: 12-10-19 (1d)  Location: [facility] 2EST 2001 01  Attending: Admitting: Camilo Menard    All: latex (Other)    Gynecology-Oncology Progress Note    BRIAN DICKINSON is a 64y s/p robot assisted total laparoscopic hysterectomy, bilateral salpingooophorectomy, sentinel lymph node dissection, and cystoscopy, POD #1, HD #2.     PROBLEMS:  Hypothyroid  Abnormal EKG  Need for prophylactic measure  Endometrium, hyperplasia  Depression  Multiple Sclerosis  Endometrial hyperplasia    SUBJECTIVE:  Pt seen and examined at bedside.   Patient is without complaints.  Pain well-controlled.  Tolerating PO intake without N/V. Denies flatus or BM.   Reports muscle spasms have improved from their baseline.  Denies HA, dizziness, fevers, chills, CP, or SOB.    OBJECTIVE:   T(F): 97.7 (12-11-19 @ 05:26), Max: 98.9 (12-10-19 @ 09:01)  HR: 77 (12-11-19 @ 05:26) (67 - 90)  BP: 89/59 (12-11-19 @ 05:26) (89/59 - 148/91)  RR: 18 (12-11-19 @ 05:26) (14 - 18)  SpO2: 97% (12-11-19 @ 05:26) (94% - 100%)    I&O's Detail    10 Dec 2019 07:01  -  11 Dec 2019 06:46  --------------------------------------------------------  IN:  Total IN: 0 mL    OUT:    Indwelling Catheter - Suprapubic: 2025 mL  Total OUT: 2025 mL    Total NET: -2025 mL    Physical Exam:  Constitutional: NAD, AOx3  Pulmonary: clear to auscultation bilaterally, no WRR  Cardiovascular: Regular rate and rhythm, S1S2  Abdomen: soft, non-tender, non-distended, normal bowel sounds, no rebounding or guarding  Extremities: no lower extremity edema or calve tenderness, Zainab's sign negative. SCDs.  Incision: Clean, dry, intact.  Without signs of infection or hernia.    LABS:  pending this AM    HOSPITAL MEDS:  MEDICATIONS  (STANDING):  baclofen 10 milliGRAM(s) Oral three times a day  buPROPion  milliGRAM(s) Oral two times a day  dextrose 5% + sodium chloride 0.45% with potassium chloride 20 mEq/L 1000 milliLiter(s) (125 mL/Hr) IV Continuous <Continuous>  enoxaparin Injectable 40 milliGRAM(s) SubCutaneous every 12 hours  ketorolac   Injectable 30 milliGRAM(s) IV Push every 6 hours  levothyroxine 50 MICROGram(s) Oral daily  nystatin Powder 1 Application(s) Topical two times a day  sodium chloride 0.9% lock flush 3 milliLiter(s) IV Push every 8 hours    MEDICATIONS  (PRN):  ondansetron Injectable 4 milliGRAM(s) IV Push every 6 hours PRN Postoperative Nausea and/or Vomiting  traMADol 25 milliGRAM(s) Oral every 4 hours PRN Moderate Pain (4 - 6)  traMADol 50 milliGRAM(s) Oral every 4 hours PRN Severe Pain (7 - 10)

## 2019-12-11 NOTE — DISCHARGE NOTE NURSING/CASE MANAGEMENT/SOCIAL WORK - NSDCPELOVENOX_GEN_ALL_CORE
Enoxaparin/Lovenox - Compliance/Enoxaparin/Lovenox - Follow up monitoring/Enoxaparin/Lovenox - Potential for adverse drug reactions and interactions/Enoxaparin/Lovenox - Dietary Advice

## 2019-12-12 LAB — NON-GYNECOLOGICAL CYTOLOGY STUDY: SIGNIFICANT CHANGE UP

## 2019-12-17 LAB — SURGICAL PATHOLOGY STUDY: SIGNIFICANT CHANGE UP

## 2019-12-19 ENCOUNTER — APPOINTMENT (OUTPATIENT)
Dept: GYNECOLOGIC ONCOLOGY | Facility: CLINIC | Age: 64
End: 2019-12-19
Payer: MEDICARE

## 2019-12-19 VITALS
RESPIRATION RATE: 16 BRPM | BODY MASS INDEX: 49.08 KG/M2 | HEIGHT: 60 IN | HEART RATE: 80 BPM | WEIGHT: 250 LBS | SYSTOLIC BLOOD PRESSURE: 128 MMHG | OXYGEN SATURATION: 100 % | DIASTOLIC BLOOD PRESSURE: 70 MMHG

## 2019-12-19 DIAGNOSIS — Z09 ENCOUNTER FOR FOLLOW-UP EXAMINATION AFTER COMPLETED TREATMENT FOR CONDITIONS OTHER THAN MALIGNANT NEOPLASM: ICD-10-CM

## 2019-12-19 PROCEDURE — 99024 POSTOP FOLLOW-UP VISIT: CPT

## 2019-12-19 NOTE — DISCUSSION/SUMMARY
[Dry] : was dry [Intact] : was intact [Clean] : was clean [Erythema] : was not erythematous [Ecchymosis] : was not ecchymotic [Sutures Intact] : had sutures  intact [Firm] : soft [None] : had no drainage [Normal Skin] : normal appearance [Abnormal Bowel Sounds] : normal bowel sounds [Tender] : nontender [Doing Well] : is doing well [Excellent Pain Control] : has excellent pain control [No Sign of Infection] : is showing no signs of infection [Clinical Recheck] : clinical recheck [de-identified] : Lungs-CTA b/l, CV-NSR, S1 S2 [Remove Sutures/Staples] : removed sutures/staples

## 2019-12-19 NOTE — REASON FOR VISIT
[Post Op] : post op visit [de-identified] : 12/10/19 [de-identified] : TEVIN TLH BSO, SLNM, cystoscopy, PW [de-identified] : Pt feels well overall. Denies fever, cp, sob or calf pain. Bowel and bladder function is normal. Pain is minimal and off pain medication.

## 2019-12-27 ENCOUNTER — APPOINTMENT (OUTPATIENT)
Dept: GYNECOLOGIC ONCOLOGY | Facility: CLINIC | Age: 64
End: 2019-12-27
Payer: MEDICARE

## 2019-12-27 VITALS
OXYGEN SATURATION: 97 % | SYSTOLIC BLOOD PRESSURE: 120 MMHG | DIASTOLIC BLOOD PRESSURE: 80 MMHG | TEMPERATURE: 97.9 F | HEART RATE: 73 BPM

## 2019-12-27 DIAGNOSIS — N85.00 ENDOMETRIAL HYPERPLASIA, UNSPECIFIED: ICD-10-CM

## 2019-12-27 PROCEDURE — 99024 POSTOP FOLLOW-UP VISIT: CPT

## 2019-12-27 RX ORDER — LEVOTHYROXINE SODIUM 0.05 MG/1
50 TABLET ORAL
Qty: 30 | Refills: 0 | Status: DISCONTINUED | COMMUNITY
Start: 2019-01-14 | End: 2019-12-27

## 2020-01-01 PROBLEM — N85.00 ENDOMETRIAL HYPERPLASIA: Status: ACTIVE | Noted: 2019-10-18

## 2020-01-01 NOTE — END OF VISIT
[FreeTextEntry3] : Written by Ada Valente, acting as a scribe for Dr. Camilo Menard \par This note accurately reflects the work and decisions made by me.

## 2020-01-01 NOTE — DISCUSSION/SUMMARY
[Dry] : was dry [Intact] : was intact [Clean] : was clean [None] : had no drainage [Normal Skin] : normal appearance [Firm] : soft [Tender] : nontender [Abnormal Bowel Sounds] : normal bowel sounds [Guarding] : no guarding [Rebound] : no rebound tenderness [Incisional Hernia] : no incisional hernia [Mass] : no palpable mass [Doing Well] : is doing well [FreeTextEntry1] : Pertinent findings revealed small anteverted uterus with normal tubes and ovaries, bilateral hypogastric sentinel nodes identified, no para-aortic sentinel nodes identified. normal upper abdomen, normal cystoscopy. \par \par Pathology reviewed.  [Findings] : These findings were discussed with [unfilled] in detail. She understood and accepted the rationale for this recommendation and also understood the serious impact that these findings could have upon her prognosis for survival.

## 2020-01-01 NOTE — REASON FOR VISIT
[Post Op] : post op visit [de-identified] : TEVIN TLH BSO, SLNM, cystoscopy, PW  [de-identified] : 12/10/2019

## 2020-04-19 NOTE — PATIENT PROFILE ADULT - DO YOU FEEL LIKE HURTING OTHERS?
PAST MEDICAL HISTORY:  Afib     Hypertension     Hypothyroid PAST MEDICAL HISTORY:  Afib     Hypertension     Hypothyroid     Vitamin D deficiency no

## 2020-06-15 NOTE — H&P PST ADULT - LAST ECHOCARDIOGRAM
DOS: 6/17/20 Pre-procedure interview was completed. Patient was instructed to take the following medications on the day of surgery: None   Notified she may have 1 adult visitor, no children, and they will be screened upon arrival to the hospital.   Testing needed Day of Surgery.      does not recall

## 2020-12-21 PROBLEM — Z01.419 ENCOUNTER FOR GYNECOLOGICAL EXAMINATION WITHOUT ABNORMAL FINDING: Status: RESOLVED | Noted: 2017-09-18 | Resolved: 2020-12-21

## 2020-12-21 PROBLEM — Z09 ENCOUNTER FOR FOLLOW-UP EXAMINATION AFTER COMPLETED TREATMENT FOR CONDITIONS OTHER THAN MALIGNANT NEOPLASM: Status: RESOLVED | Noted: 2019-12-19 | Resolved: 2020-12-21

## 2021-11-15 ENCOUNTER — APPOINTMENT (OUTPATIENT)
Dept: OPHTHALMOLOGY | Facility: CLINIC | Age: 66
End: 2021-11-15
Payer: MEDICARE

## 2021-11-15 ENCOUNTER — NON-APPOINTMENT (OUTPATIENT)
Age: 66
End: 2021-11-15

## 2021-11-15 PROCEDURE — 92004 COMPRE OPH EXAM NEW PT 1/>: CPT

## 2022-04-05 NOTE — H&P PST ADULT - FEMALE-SPECIFIC SYMPTOMS
Loop Recorder Insertion 4/15 at 12 pm    Arrive for your procedure at: [facility]    NO PREP IS NECESSARY    The Only-apartmentstronic loop recorder monitors a persons heart rate 24 hours a day and has a battery life of up to 3 years.  The device is approximately one-third of the size of a AAA battery and is also safe for use in an MRI.  It is placed beneath the skin through a small incision in the left upper side of the chest wall.  The procedure is minimally invasive so anesthesia is not needed to implant the device.  The procedure can be done in about 10 minutes.      You may eat or drink the day of the procedure.    Please take all of your medications on the day of scheduled procedure.    This WILL NOT require anesthesia or an IV.    You DO NOT need someone to drive you home.    abnormal vaginal bleeding

## 2022-11-22 ENCOUNTER — APPOINTMENT (OUTPATIENT)
Dept: OPHTHALMOLOGY | Facility: CLINIC | Age: 67
End: 2022-11-22

## 2022-11-22 ENCOUNTER — NON-APPOINTMENT (OUTPATIENT)
Age: 67
End: 2022-11-22

## 2022-11-22 PROCEDURE — 92014 COMPRE OPH EXAM EST PT 1/>: CPT

## 2023-03-20 ENCOUNTER — APPOINTMENT (OUTPATIENT)
Dept: OPHTHALMOLOGY | Facility: CLINIC | Age: 68
End: 2023-03-20
Payer: MEDICARE

## 2023-03-20 ENCOUNTER — NON-APPOINTMENT (OUTPATIENT)
Age: 68
End: 2023-03-20

## 2023-03-20 PROCEDURE — 92014 COMPRE OPH EXAM EST PT 1/>: CPT

## 2023-09-12 ENCOUNTER — APPOINTMENT (OUTPATIENT)
Dept: OPHTHALMOLOGY | Facility: CLINIC | Age: 68
End: 2023-09-12

## 2023-09-19 NOTE — ASU PREOP CHECKLIST - INTERNAL PROSTHESES
suprapubic catheter, pain pump Consent (Temporal Branch)/Introductory Paragraph: The rationale for Mohs was explained to the patient and consent was obtained. The risks, benefits and alternatives to therapy were discussed in detail. Specifically, the risks of damage to the temporal branch of the facial nerve, infection, scarring, bleeding, prolonged wound healing, incomplete removal, allergy to anesthesia, and recurrence were addressed. Prior to the procedure, the treatment site was clearly identified and confirmed by the patient. All components of Universal Protocol/PAUSE Rule completed.

## 2023-10-16 ENCOUNTER — APPOINTMENT (OUTPATIENT)
Dept: OPHTHALMOLOGY | Facility: CLINIC | Age: 68
End: 2023-10-16

## 2023-11-15 ENCOUNTER — APPOINTMENT (OUTPATIENT)
Dept: OPHTHALMOLOGY | Facility: CLINIC | Age: 68
End: 2023-11-15
Payer: MEDICARE

## 2023-11-15 ENCOUNTER — NON-APPOINTMENT (OUTPATIENT)
Age: 68
End: 2023-11-15

## 2023-11-15 PROCEDURE — 92014 COMPRE OPH EXAM EST PT 1/>: CPT

## 2023-12-05 ENCOUNTER — NON-APPOINTMENT (OUTPATIENT)
Age: 68
End: 2023-12-05

## 2023-12-05 ENCOUNTER — APPOINTMENT (OUTPATIENT)
Dept: OPHTHALMOLOGY | Facility: CLINIC | Age: 68
End: 2023-12-05
Payer: SELF-PAY

## 2023-12-05 PROCEDURE — 92015 DETERMINE REFRACTIVE STATE: CPT

## 2024-05-15 ENCOUNTER — APPOINTMENT (OUTPATIENT)
Dept: OPHTHALMOLOGY | Facility: CLINIC | Age: 69
End: 2024-05-15
Payer: MEDICARE

## 2024-05-15 ENCOUNTER — NON-APPOINTMENT (OUTPATIENT)
Age: 69
End: 2024-05-15

## 2024-05-15 PROCEDURE — 92014 COMPRE OPH EXAM EST PT 1/>: CPT

## 2024-07-10 ENCOUNTER — APPOINTMENT (OUTPATIENT)
Dept: ORTHOPEDIC SURGERY | Facility: CLINIC | Age: 69
End: 2024-07-10

## 2024-12-02 ENCOUNTER — NON-APPOINTMENT (OUTPATIENT)
Age: 69
End: 2024-12-02

## 2024-12-10 ENCOUNTER — NON-APPOINTMENT (OUTPATIENT)
Age: 69
End: 2024-12-10

## 2025-05-27 ENCOUNTER — APPOINTMENT (OUTPATIENT)
Dept: OPHTHALMOLOGY | Facility: CLINIC | Age: 70
End: 2025-05-27

## 2025-06-16 ENCOUNTER — NON-APPOINTMENT (OUTPATIENT)
Age: 70
End: 2025-06-16

## 2025-06-16 ENCOUNTER — APPOINTMENT (OUTPATIENT)
Dept: OPHTHALMOLOGY | Facility: CLINIC | Age: 70
End: 2025-06-16
Payer: MEDICARE

## 2025-06-16 PROCEDURE — 92014 COMPRE OPH EXAM EST PT 1/>: CPT
